# Patient Record
Sex: FEMALE | Race: WHITE | NOT HISPANIC OR LATINO | Employment: UNEMPLOYED | URBAN - METROPOLITAN AREA
[De-identification: names, ages, dates, MRNs, and addresses within clinical notes are randomized per-mention and may not be internally consistent; named-entity substitution may affect disease eponyms.]

---

## 2018-11-19 ENCOUNTER — HOSPITAL ENCOUNTER (EMERGENCY)
Facility: HOSPITAL | Age: 33
Discharge: HOME | End: 2018-11-19
Attending: EMERGENCY MEDICINE | Admitting: EMERGENCY MEDICINE

## 2018-11-19 VITALS
WEIGHT: 165 LBS | OXYGEN SATURATION: 97 % | TEMPERATURE: 99 F | SYSTOLIC BLOOD PRESSURE: 137 MMHG | DIASTOLIC BLOOD PRESSURE: 84 MMHG | BODY MASS INDEX: 32.39 KG/M2 | HEIGHT: 60 IN | RESPIRATION RATE: 18 BRPM | HEART RATE: 94 BPM

## 2018-11-19 DIAGNOSIS — L02.519 ABSCESS OF INDEX FINGER: Primary | ICD-10-CM

## 2018-11-19 PROCEDURE — 99283 EMERGENCY DEPT VISIT LOW MDM: CPT

## 2018-11-19 RX ORDER — IBUPROFEN 400 MG/1
800 TABLET ORAL ONCE
Status: COMPLETED | OUTPATIENT
Start: 2018-11-19 | End: 2018-11-19

## 2018-11-19 RX ORDER — ACETAMINOPHEN AND CODEINE PHOSPHATE 300; 30 MG/1; MG/1
1-2 TABLET ORAL EVERY 6 HOURS PRN
Qty: 15 TABLET | Refills: 0 | Status: SHIPPED | OUTPATIENT
Start: 2018-11-19 | End: 2018-11-29

## 2018-11-19 RX ORDER — CIPROFLOXACIN 500 MG/1
500 TABLET, FILM COATED ORAL ONCE
Status: COMPLETED | OUTPATIENT
Start: 2018-11-19 | End: 2018-11-19

## 2018-11-19 RX ORDER — CIPROFLOXACIN 500 MG/1
500 TABLET, FILM COATED ORAL 2 TIMES DAILY
Qty: 20 TABLET | Refills: 0 | Status: SHIPPED | OUTPATIENT
Start: 2018-11-19 | End: 2018-11-24

## 2018-11-19 RX ADMIN — CIPROFLOXACIN HYDROCHLORIDE 500 MG: 500 TABLET, FILM COATED ORAL at 20:12

## 2018-11-19 RX ADMIN — IBUPROFEN 800 MG: 400 TABLET ORAL at 20:12

## 2018-11-20 NOTE — ED PROVIDER NOTES
History  Chief Complaint   Patient presents with    Finger Pain     29YEAR-OLD FEMALE WITH NO HISTORY OF DISEASE IS DOMINANT RIGHT HAND PATIENT PRESENTS NOW WITH RIGHT INDEX FINGER PAIN FOR THE PAST 3 DAYS  SHE DENIES ANY TRAUMA OR INCITING EVENT  THE INTENSITY OF THE PAIN HAS ESCALATED TODAY DESPITE TAKING TYLENOL AND MOTRIN  SHE REPORTS NO FEVER OR CHILLS  NO CHANGE IN BOWEL OR BLADDER HABITS  None       Past Medical History:   Diagnosis Date    Asthma        Past Surgical History:   Procedure Laterality Date     SECTION         History reviewed  No pertinent family history  I have reviewed and agree with the history as documented  Social History   Substance Use Topics    Smoking status: Never Smoker    Smokeless tobacco: Never Used    Alcohol use No        Review of Systems   Constitutional: Negative for chills and fever  HENT: Negative for ear pain, rhinorrhea and sore throat  Eyes: Negative for pain, redness and visual disturbance  Respiratory: Negative for cough and shortness of breath  Cardiovascular: Negative for chest pain and leg swelling  Gastrointestinal: Negative for abdominal pain, diarrhea, nausea and vomiting  Genitourinary: Negative for dysuria, flank pain, frequency and urgency  Musculoskeletal: Negative for back pain, myalgias and neck pain  Skin: Negative for rash  Neurological: Negative for dizziness, weakness, light-headedness and headaches  Hematological: Negative  Psychiatric/Behavioral: Negative for agitation, confusion and suicidal ideas  The patient is not nervous/anxious  All other systems reviewed and are negative  Physical Exam  Physical Exam   Constitutional: She is oriented to person, place, and time  She appears well-developed and well-nourished  HENT:   Nose: Nose normal    Mouth/Throat: Oropharynx is clear and moist  No oropharyngeal exudate  Eyes: Pupils are equal, round, and reactive to light   Conjunctivae and EOM are normal  No scleral icterus  Neck: Normal range of motion  Neck supple  No JVD present  No tracheal deviation present  Cardiovascular: Normal rate, regular rhythm and normal heart sounds  No murmur heard  Pulmonary/Chest: Effort normal and breath sounds normal  No respiratory distress  She has no wheezes  She has no rales  Abdominal: Soft  Bowel sounds are normal  There is no tenderness  There is no guarding  Musculoskeletal: Normal range of motion  She exhibits edema and tenderness  CC SKIN EXAM FOR FURTHER DETAILS  Neurological: She is alert and oriented to person, place, and time  No cranial nerve deficit or sensory deficit  She exhibits normal muscle tone  5/5 motor, nl sens   Skin: Skin is warm and dry  THE RIGHT DISTAL INDEX FINGER THE ULNAR ASPECT THERE BEARS A PEARLY CAPPED EDEMATOUS FLUCTUANT LESION WITH MINIMAL SURROUNDING ERYTHEMA  THERE IS NO ENCROACHMENT ON THE NAIL  THERE IS NO DISCHARGE FROM THE LESION  THERE IS ASSOCIATED TENDERNESS WITH PALPATION OF THE LESION  Psychiatric: She has a normal mood and affect  Her behavior is normal    Nursing note and vitals reviewed        Vital Signs  ED Triage Vitals [11/19/18 1935]   Temperature Pulse Respirations Blood Pressure SpO2   99 °F (37 2 °C) 94 18 137/84 97 %      Temp src Heart Rate Source Patient Position - Orthostatic VS BP Location FiO2 (%)   -- -- -- -- --      Pain Score       8           Vitals:    11/19/18 1935   BP: 137/84   Pulse: 94       Visual Acuity      ED Medications  Medications   ciprofloxacin (CIPRO) tablet 500 mg (500 mg Oral Given 11/19/18 2012)   ibuprofen (MOTRIN) tablet 800 mg (800 mg Oral Given 11/19/18 2012)       Diagnostic Studies  Results Reviewed     None                 No orders to display              Procedures  Procedures       Phone Contacts  ED Phone Contact    ED Course                               MDM  Number of Diagnoses or Management Options  Abscess of index finger:   Diagnosis management comments: PHYSICAL EXAM FINDINGS ARE CONSISTENT WITH AN ABSCESS OF THE FINGER  THE PATIENT HAS BEEN OFFERED THE OPTION OF INCISION AND DRAINAGE VERSUS IS WARM SOAKS ANTIBIOTICS AND PAIN MEDICATIONS  PATIENT HAS ELECTED CONSERVATIVE APPROACH OF ANTIBIOTICS PAIN MEDICATIONS AND WARM SOAKS WITH A CAVITY ON THE PATIENT FOLLOW UP WITHIN THE NEXT 2 DAYS AND CONSIDER INCISION AND DRAINAGE IF THERE IS NO IMPROVEMENT  CritCare Time    Disposition  Final diagnoses:   Abscess of index finger     Time reflects when diagnosis was documented in both MDM as applicable and the Disposition within this note     Time User Action Codes Description Comment    11/19/2018  8:08 PM Catarina Eugene Add [L02 [id number]] Abscess of index finger       ED Disposition     ED Disposition Condition Comment    Discharge  Leon Rodriguez discharge to home/self care  Condition at discharge: Stable        Follow-up Information     Follow up With Specialties Details Why Contact Info    RECHECK IN ED IN 2 DAYS              Discharge Medication List as of 11/19/2018  8:10 PM      START taking these medications    Details   acetaminophen-codeine (TYLENOL #3) 300-30 mg per tablet Take 1-2 tablets by mouth every 6 (six) hours as needed for moderate pain for up to 10 days, Starting Mon 11/19/2018, Until Thu 11/29/2018, Print      ciprofloxacin (CIPRO) 500 mg tablet Take 1 tablet (500 mg total) by mouth 2 (two) times a day for 10 days, Starting Mon 11/19/2018, Until Thu 11/29/2018, Print           No discharge procedures on file      ED Provider  Electronically Signed by           Yandel Mosqueda MD  11/23/18 5631

## 2018-11-20 NOTE — DISCHARGE INSTRUCTIONS
Abscess   WHAT YOU NEED TO KNOW:   A warm compress may help your abscess drain  Your healthcare provider may make a cut in the abscess so it can drain  You may need surgery to remove an abscess that is on your hands or buttocks  DISCHARGE INSTRUCTIONS:   Return to the emergency department if:   · The area around your abscess becomes very painful, warm, or has red streaks  · You have a fever and chills  · Your heart is beating faster than usual      · You feel faint or confused  Contact your healthcare provider if:   · Your abscess gets bigger or does not get better  · Your abscess returns  · You have questions or concerns about your condition or care  Medicines: You may  need any of the following:  · Antibiotics  help treat a bacterial infection  · Acetaminophen  decreases pain and fever  It is available without a doctor's order  Ask how much to take and how often to take it  Follow directions  Acetaminophen can cause liver damage if not taken correctly  · NSAIDs , such as ibuprofen, help decrease swelling, pain, and fever  This medicine is available with or without a doctor's order  NSAIDs can cause stomach bleeding or kidney problems in certain people  If you take blood thinner medicine, always ask your healthcare provider if NSAIDs are safe for you  Always read the medicine label and follow directions  · Take your medicine as directed  Contact your healthcare provider if you think your medicine is not helping or if you have side effects  Tell him or her if you are allergic to any medicine  Keep a list of the medicines, vitamins, and herbs you take  Include the amounts, and when and why you take them  Bring the list or the pill bottles to follow-up visits  Carry your medicine list with you in case of an emergency  Self-care:   · Apply a warm compress to your abscess  This will help it open and drain  Wet a washcloth in warm, but not hot, water  Apply the compress for 10 minutes  Repeat this 4 times each day  Do not  press on an abscess or try to open it with a needle  You may push the bacteria deeper or into your blood  · Do not share your clothes, towels, or sheets with anyone  This can spread the infection to others  · Wash your hands often  This can help prevent the spread of germs  Use soap and water or an alcohol-based hand rub  Care for your wound after it is drained:   · Care for your wound as directed  If your healthcare provider says it is okay, carefully remove the bandage and gauze packing  You may need to soak the gauze to get it out of your wound  Clean your wound and the area around it as directed  Dry the area and put on new, clean bandages  Change your bandages when they get wet or dirty  · Ask your healthcare provider how to change the gauze in your wound  Keep track of how many pieces of gauze are placed inside the wound  Do not put too much packing in the wound  Do not pack the gauze too tightly in your wound  Follow up with your healthcare provider in 1 to 3 days: You may need to have your packing removed or your bandage changed  Write down your questions so you remember to ask them during your visits  © 2017 [facility] Information is for End User's use only and may not be sold, redistributed or otherwise used for commercial purposes  All illustrations and images included in CareNotes® are the copyrighted property of [employer]  or [employer]  The above information is an  only  It is not intended as medical advice for individual conditions or treatments  Talk to your doctor, nurse or pharmacist before following any medical regimen to see if it is safe and effective for you  Abscess   WHAT YOU NEED TO KNOW:   A warm compress may help your abscess drain  Your healthcare provider may make a cut in the abscess so it can drain   You may need surgery to remove an abscess that is on your hands or buttocks  DISCHARGE INSTRUCTIONS:   Return to the emergency department if:   · The area around your abscess becomes very painful, warm, or has red streaks  · You have a fever and chills  · Your heart is beating faster than usual      · You feel faint or confused  Contact your healthcare provider if:   · Your abscess gets bigger or does not get better  · Your abscess returns  · You have questions or concerns about your condition or care  Medicines: You may  need any of the following:  · Antibiotics  help treat a bacterial infection  · Acetaminophen  decreases pain and fever  It is available without a doctor's order  Ask how much to take and how often to take it  Follow directions  Acetaminophen can cause liver damage if not taken correctly  · NSAIDs , such as ibuprofen, help decrease swelling, pain, and fever  This medicine is available with or without a doctor's order  NSAIDs can cause stomach bleeding or kidney problems in certain people  If you take blood thinner medicine, always ask your healthcare provider if NSAIDs are safe for you  Always read the medicine label and follow directions  · Take your medicine as directed  Contact your healthcare provider if you think your medicine is not helping or if you have side effects  Tell him or her if you are allergic to any medicine  Keep a list of the medicines, vitamins, and herbs you take  Include the amounts, and when and why you take them  Bring the list or the pill bottles to follow-up visits  Carry your medicine list with you in case of an emergency  Self-care:   · Apply a warm compress to your abscess  This will help it open and drain  Wet a washcloth in warm, but not hot, water  Apply the compress for 10 minutes  Repeat this 4 times each day  Do not  press on an abscess or try to open it with a needle  You may push the bacteria deeper or into your blood  · Do not share your clothes, towels, or sheets with anyone    This can spread the infection to others  · Wash your hands often  This can help prevent the spread of germs  Use soap and water or an alcohol-based hand rub  Care for your wound after it is drained:   · Care for your wound as directed  If your healthcare provider says it is okay, carefully remove the bandage and gauze packing  You may need to soak the gauze to get it out of your wound  Clean your wound and the area around it as directed  Dry the area and put on new, clean bandages  Change your bandages when they get wet or dirty  · Ask your healthcare provider how to change the gauze in your wound  Keep track of how many pieces of gauze are placed inside the wound  Do not put too much packing in the wound  Do not pack the gauze too tightly in your wound  Follow up with your healthcare provider in 1 to 3 days: You may need to have your packing removed or your bandage changed  Write down your questions so you remember to ask them during your visits  © 2017 [facility] Information is for End User's use only and may not be sold, redistributed or otherwise used for commercial purposes  All illustrations and images included in CareNotes® are the copyrighted property of [employer]  or [employer]  The above information is an  only  It is not intended as medical advice for individual conditions or treatments  Talk to your doctor, nurse or pharmacist before following any medical regimen to see if it is safe and effective for you

## 2018-11-24 ENCOUNTER — OFFICE VISIT (OUTPATIENT)
Dept: URGENT CARE | Facility: CLINIC | Age: 33
End: 2018-11-24

## 2018-11-24 ENCOUNTER — HOSPITAL ENCOUNTER (EMERGENCY)
Facility: HOSPITAL | Age: 33
Discharge: HOME | End: 2018-11-24
Attending: EMERGENCY MEDICINE | Admitting: EMERGENCY MEDICINE

## 2018-11-24 ENCOUNTER — APPOINTMENT (EMERGENCY)
Dept: RADIOLOGY | Facility: HOSPITAL | Age: 33
End: 2018-11-24

## 2018-11-24 VITALS
HEART RATE: 78 BPM | OXYGEN SATURATION: 98 % | SYSTOLIC BLOOD PRESSURE: 136 MMHG | BODY MASS INDEX: 32.39 KG/M2 | RESPIRATION RATE: 16 BRPM | DIASTOLIC BLOOD PRESSURE: 70 MMHG | HEIGHT: 60 IN | TEMPERATURE: 98.2 F | WEIGHT: 165 LBS

## 2018-11-24 VITALS
HEIGHT: 60 IN | WEIGHT: 165 LBS | TEMPERATURE: 98.2 F | RESPIRATION RATE: 16 BRPM | OXYGEN SATURATION: 100 % | DIASTOLIC BLOOD PRESSURE: 86 MMHG | SYSTOLIC BLOOD PRESSURE: 160 MMHG | BODY MASS INDEX: 32.39 KG/M2 | HEART RATE: 92 BPM

## 2018-11-24 DIAGNOSIS — L02.519 ABSCESS OF INDEX FINGER: ICD-10-CM

## 2018-11-24 DIAGNOSIS — L03.011 PARONYCHIA OF FINGER OF RIGHT HAND: Primary | ICD-10-CM

## 2018-11-24 DIAGNOSIS — L02.511 ABSCESS OF FINGER OF RIGHT HAND: ICD-10-CM

## 2018-11-24 DIAGNOSIS — L02.511 ABSCESS OF RIGHT INDEX FINGER: Primary | ICD-10-CM

## 2018-11-24 LAB
ALBUMIN SERPL BCP-MCNC: 4.4 G/DL (ref 3.5–5.7)
ALP SERPL-CCNC: 92 U/L (ref 40–150)
ALT SERPL W P-5'-P-CCNC: 8 U/L (ref 7–52)
ANION GAP SERPL CALCULATED.3IONS-SCNC: 8 MMOL/L (ref 4–13)
APTT PPP: 27 SECONDS (ref 26–38)
AST SERPL W P-5'-P-CCNC: 14 U/L (ref 13–39)
BASOPHILS # BLD AUTO: 0.1 THOUSANDS/ΜL (ref 0–0.1)
BASOPHILS NFR BLD AUTO: 1 % (ref 0–1)
BILIRUB SERPL-MCNC: 0.3 MG/DL (ref 0.2–1)
BUN SERPL-MCNC: 9 MG/DL (ref 7–25)
CALCIUM SERPL-MCNC: 9.6 MG/DL (ref 8.6–10.5)
CHLORIDE SERPL-SCNC: 102 MMOL/L (ref 98–107)
CO2 SERPL-SCNC: 25 MMOL/L (ref 21–31)
CREAT SERPL-MCNC: 0.77 MG/DL (ref 0.6–1.2)
EOSINOPHIL # BLD AUTO: 0.1 THOUSAND/ΜL (ref 0–0.61)
EOSINOPHIL NFR BLD AUTO: 1 % (ref 0–6)
ERYTHROCYTE [DISTWIDTH] IN BLOOD BY AUTOMATED COUNT: 12.8 % (ref 11.6–15.1)
EXT PREG TEST URINE: NORMAL
GFR SERPL CREATININE-BSD FRML MDRD: 102 ML/MIN/1.73SQ M
GLUCOSE SERPL-MCNC: 88 MG/DL (ref 65–140)
HCT VFR BLD AUTO: 42.3 % (ref 37–47)
HGB BLD-MCNC: 14.2 G/DL (ref 11.5–15.4)
INR PPP: 0.93 (ref 0.9–1.5)
LACTATE SERPL-SCNC: 1.5 MMOL/L (ref 0.5–2)
LYMPHOCYTES # BLD AUTO: 1.9 THOUSANDS/ΜL (ref 0.6–4.47)
LYMPHOCYTES NFR BLD AUTO: 18 % (ref 14–44)
MCH RBC QN AUTO: 29.3 PG (ref 26.8–34.3)
MCHC RBC AUTO-ENTMCNC: 33.6 G/DL (ref 31.4–37.4)
MCV RBC AUTO: 87 FL (ref 82–98)
MONOCYTES # BLD AUTO: 0.8 THOUSAND/ΜL (ref 0.17–1.22)
MONOCYTES NFR BLD AUTO: 8 % (ref 4–12)
NEUTROPHILS # BLD AUTO: 7.4 THOUSANDS/ΜL (ref 1.85–7.62)
NEUTS SEG NFR BLD AUTO: 73 % (ref 43–75)
NRBC BLD AUTO-RTO: 0 /100 WBCS
PLATELET # BLD AUTO: 327 THOUSANDS/UL (ref 149–390)
PMV BLD AUTO: 7.9 FL (ref 8.9–12.7)
POTASSIUM SERPL-SCNC: 4.2 MMOL/L (ref 3.5–5.5)
PROT SERPL-MCNC: 7.6 G/DL (ref 6.4–8.9)
PROTHROMBIN TIME: 10.7 SECONDS (ref 10.2–13)
RBC # BLD AUTO: 4.84 MILLION/UL (ref 3.81–5.12)
SODIUM SERPL-SCNC: 135 MMOL/L (ref 134–143)
WBC # BLD AUTO: 10.2 THOUSAND/UL (ref 4.31–10.16)

## 2018-11-24 PROCEDURE — 83605 ASSAY OF LACTIC ACID: CPT | Performed by: EMERGENCY MEDICINE

## 2018-11-24 PROCEDURE — 73140 X-RAY EXAM OF FINGER(S): CPT

## 2018-11-24 PROCEDURE — 87040 BLOOD CULTURE FOR BACTERIA: CPT | Performed by: EMERGENCY MEDICINE

## 2018-11-24 PROCEDURE — 81025 URINE PREGNANCY TEST: CPT | Performed by: EMERGENCY MEDICINE

## 2018-11-24 PROCEDURE — 85025 COMPLETE CBC W/AUTO DIFF WBC: CPT | Performed by: EMERGENCY MEDICINE

## 2018-11-24 PROCEDURE — 85610 PROTHROMBIN TIME: CPT | Performed by: EMERGENCY MEDICINE

## 2018-11-24 PROCEDURE — 99211 OFF/OP EST MAY X REQ PHY/QHP: CPT | Performed by: PHYSICIAN ASSISTANT

## 2018-11-24 PROCEDURE — 85730 THROMBOPLASTIN TIME PARTIAL: CPT | Performed by: EMERGENCY MEDICINE

## 2018-11-24 PROCEDURE — 96365 THER/PROPH/DIAG IV INF INIT: CPT

## 2018-11-24 PROCEDURE — 96375 TX/PRO/DX INJ NEW DRUG ADDON: CPT

## 2018-11-24 PROCEDURE — 99283 EMERGENCY DEPT VISIT LOW MDM: CPT

## 2018-11-24 PROCEDURE — 36415 COLL VENOUS BLD VENIPUNCTURE: CPT | Performed by: EMERGENCY MEDICINE

## 2018-11-24 PROCEDURE — 80053 COMPREHEN METABOLIC PANEL: CPT | Performed by: EMERGENCY MEDICINE

## 2018-11-24 RX ORDER — OXYCODONE HYDROCHLORIDE AND ACETAMINOPHEN 5; 325 MG/1; MG/1
1 TABLET ORAL ONCE
Status: COMPLETED | OUTPATIENT
Start: 2018-11-24 | End: 2018-11-24

## 2018-11-24 RX ORDER — CLINDAMYCIN HYDROCHLORIDE 150 MG/1
150 CAPSULE ORAL EVERY 6 HOURS SCHEDULED
Qty: 28 CAPSULE | Refills: 0 | Status: SHIPPED | OUTPATIENT
Start: 2018-11-24 | End: 2018-12-01

## 2018-11-24 RX ORDER — CLINDAMYCIN PHOSPHATE 600 MG/50ML
600 INJECTION INTRAVENOUS ONCE
Status: COMPLETED | OUTPATIENT
Start: 2018-11-24 | End: 2018-11-24

## 2018-11-24 RX ORDER — MORPHINE SULFATE 4 MG/ML
4 INJECTION, SOLUTION INTRAMUSCULAR; INTRAVENOUS ONCE
Status: COMPLETED | OUTPATIENT
Start: 2018-11-24 | End: 2018-11-24

## 2018-11-24 RX ORDER — LIDOCAINE HYDROCHLORIDE 10 MG/ML
10 INJECTION, SOLUTION EPIDURAL; INFILTRATION; INTRACAUDAL; PERINEURAL ONCE
Status: COMPLETED | OUTPATIENT
Start: 2018-11-24 | End: 2018-11-24

## 2018-11-24 RX ADMIN — MORPHINE SULFATE 4 MG: 4 INJECTION INTRAVENOUS at 12:18

## 2018-11-24 RX ADMIN — LIDOCAINE HYDROCHLORIDE 10 ML: 10 INJECTION, SOLUTION EPIDURAL; INFILTRATION; INTRACAUDAL; PERINEURAL at 16:05

## 2018-11-24 RX ADMIN — CLINDAMYCIN IN 5 PERCENT DEXTROSE 600 MG: 12 INJECTION, SOLUTION INTRAVENOUS at 12:18

## 2018-11-24 RX ADMIN — SODIUM CHLORIDE 1000 ML: 0.9 INJECTION, SOLUTION INTRAVENOUS at 12:12

## 2018-11-24 RX ADMIN — OXYCODONE HYDROCHLORIDE AND ACETAMINOPHEN 1 TABLET: 5; 325 TABLET ORAL at 16:16

## 2018-11-24 NOTE — DISCHARGE INSTRUCTIONS
Abscess   WHAT YOU NEED TO KNOW:   A warm compress may help your abscess drain  Your healthcare provider may make a cut in the abscess so it can drain  You may need surgery to remove an abscess that is on your hands or buttocks  DISCHARGE INSTRUCTIONS:   Return to the emergency department if:   · The area around your abscess becomes very painful, warm, or has red streaks  · You have a fever and chills  · Your heart is beating faster than usual      · You feel faint or confused  Contact your healthcare provider if:   · Your abscess gets bigger or does not get better  · Your abscess returns  · You have questions or concerns about your condition or care  Medicines: You may  need any of the following:  · Antibiotics  help treat a bacterial infection  · Acetaminophen  decreases pain and fever  It is available without a doctor's order  Ask how much to take and how often to take it  Follow directions  Acetaminophen can cause liver damage if not taken correctly  · NSAIDs , such as ibuprofen, help decrease swelling, pain, and fever  This medicine is available with or without a doctor's order  NSAIDs can cause stomach bleeding or kidney problems in certain people  If you take blood thinner medicine, always ask your healthcare provider if NSAIDs are safe for you  Always read the medicine label and follow directions  · Take your medicine as directed  Contact your healthcare provider if you think your medicine is not helping or if you have side effects  Tell him or her if you are allergic to any medicine  Keep a list of the medicines, vitamins, and herbs you take  Include the amounts, and when and why you take them  Bring the list or the pill bottles to follow-up visits  Carry your medicine list with you in case of an emergency  Self-care:   · Apply a warm compress to your abscess  This will help it open and drain  Wet a washcloth in warm, but not hot, water  Apply the compress for 10 minutes  Repeat this 4 times each day  Do not  press on an abscess or try to open it with a needle  You may push the bacteria deeper or into your blood  · Do not share your clothes, towels, or sheets with anyone  This can spread the infection to others  · Wash your hands often  This can help prevent the spread of germs  Use soap and water or an alcohol-based hand rub  Care for your wound after it is drained:   · Care for your wound as directed  If your healthcare provider says it is okay, carefully remove the bandage and gauze packing  You may need to soak the gauze to get it out of your wound  Clean your wound and the area around it as directed  Dry the area and put on new, clean bandages  Change your bandages when they get wet or dirty  · Ask your healthcare provider how to change the gauze in your wound  Keep track of how many pieces of gauze are placed inside the wound  Do not put too much packing in the wound  Do not pack the gauze too tightly in your wound  Follow up with your healthcare provider in 1 to 3 days: You may need to have your packing removed or your bandage changed  Write down your questions so you remember to ask them during your visits  © 2017 [employer] [employer]  Information is for End User's use only and may not be sold, redistributed or otherwise used for commercial purposes  All illustrations and images included in CareNotes® are the copyrighted property of [email or web address]  or [employer]  The above information is an  only  It is not intended as medical advice for individual conditions or treatments  Talk to your doctor, nurse or pharmacist before following any medical regimen to see if it is safe and effective for you

## 2018-11-24 NOTE — PROGRESS NOTES
[facility]        NAME: Sarah Odell is a 29 y o  female  : 1985    MRN: [id number]  DATE: 2018  TIME: 11:03 AM    Assessment and Plan   Abscess of right index finger [L02 511]  1  Abscess of right index finger  Transfer to other facility         Patient Instructions       Go to the emergency room for further evaluation and treatment  Chief Complaint     Chief Complaint   Patient presents with    Finger Pain         History of Present Illness       Patient was seen in the emergency room 5 days ago where she was diagnosed with the abscess of the right index finger  Patient was prescribed Cipro and Tylenol 3 for pain  Patient did not  either prescription now presents with increased pain and swelling of the finger  She denies any fever chills  Patient states that she did not have the money to a  the prescribed medication  Patient's boyfriend applied black drawing salve to the finger hoping to drain the area  This was ineffective  Review of Systems   Review of Systems   Constitutional: Negative for fever  Neurological: Negative for weakness and numbness  Current Medications       Current Outpatient Prescriptions:     acetaminophen-codeine (TYLENOL #3) 300-30 mg per tablet, Take 1-2 tablets by mouth every 6 (six) hours as needed for moderate pain for up to 10 days, Disp: 15 tablet, Rfl: 0    ciprofloxacin (CIPRO) 500 mg tablet, Take 1 tablet (500 mg total) by mouth 2 (two) times a day for 10 days, Disp: 20 tablet, Rfl: 0  No current facility-administered medications for this visit       Current Allergies     Allergies as of 2018 - Reviewed 2018   Allergen Reaction Noted    Amoxicillin  2016    Penicillins  2016            The following portions of the patient's history were reviewed and updated as appropriate: allergies, current medications, past family history, past medical history, past social history, past surgical history and problem list      Past Medical History:   Diagnosis Date    Asthma        Past Surgical History:   Procedure Laterality Date     SECTION         History reviewed  No pertinent family history  Medications have been verified  Objective   /86   Pulse 92   Temp 98 2 °F (36 8 °C)   Resp 16   Ht 5' (1 524 m)   Wt 74 8 kg (165 lb)   SpO2 100%   BMI 32 22 kg/m²        Physical Exam     Physical Exam   Constitutional: She appears well-developed and well-nourished  Musculoskeletal:   Swelling of the right index finger with what appears to be an abscess of the distal aspect  Mild warmth with exquisite tenderness to palpation  Psychiatric: She has a normal mood and affect  Her behavior is normal  Judgment and thought content normal    Nursing note and vitals reviewed  Patient was referred to the emergency room for I and D, pain control and possible blood work and IV antibiotics of which the latter 2 are not offered at urgent care

## 2018-11-24 NOTE — ED PROVIDER NOTES
History  Chief Complaint   Patient presents with    Abscess     According to the patient,she has had an abscess on the right index finger for the past 6 days  Patient reports swelling and pain in the finger  72-year-old female with history of asthma and IV drug abuse in the past presents for evaluation of right index finger abscess  Patient with symptoms x1 week, was evaluated in [facility] ED 2018 and at that time patient refused I&D and did not fill the prescription for ciprofloxacin as an outpatient  Patient reports continued swelling and pain of the right index finger which is worsening over time  Otherwise patient no fevers, chills, additional injury or complaint  History provided by:  Patient   used: No        Prior to Admission Medications   Prescriptions Last Dose Informant Patient Reported? Taking?   acetaminophen-codeine (TYLENOL #3) 300-30 mg per tablet   No No   Sig: Take 1-2 tablets by mouth every 6 (six) hours as needed for moderate pain for up to 10 days   ciprofloxacin (CIPRO) 500 mg tablet   No No   Sig: Take 1 tablet (500 mg total) by mouth 2 (two) times a day for 10 days      Facility-Administered Medications: None       Past Medical History:   Diagnosis Date    Asthma        Past Surgical History:   Procedure Laterality Date     SECTION      WISDOM TOOTH EXTRACTION         History reviewed  No pertinent family history  I have reviewed and agree with the history as documented  Social History   Substance Use Topics    Smoking status: Never Smoker    Smokeless tobacco: Never Used    Alcohol use No        Review of Systems   Constitutional: Negative for chills and fever  HENT: Negative for rhinorrhea and sore throat  Eyes: Negative for visual disturbance  Respiratory: Negative for cough and shortness of breath  Cardiovascular: Negative for chest pain and leg swelling     Gastrointestinal: Negative for abdominal pain, diarrhea, nausea and vomiting  Genitourinary: Negative for dysuria  Musculoskeletal: Negative for back pain and myalgias  Right index finger pain and swelling with abscess   Skin: Positive for wound  Negative for rash  Neurological: Negative for dizziness and headaches  Psychiatric/Behavioral: Negative for confusion  All other systems reviewed and are negative  Physical Exam  Physical Exam   Constitutional: She is oriented to person, place, and time  She appears well-developed and well-nourished  She appears distressed  Moderate painful distress   HENT:   Nose: Nose normal    Mouth/Throat: Oropharynx is clear and moist  No oropharyngeal exudate  Eyes: Pupils are equal, round, and reactive to light  Conjunctivae and EOM are normal  No scleral icterus  Neck: Normal range of motion  Neck supple  No JVD present  No tracheal deviation present  Cardiovascular: Normal rate, regular rhythm and normal heart sounds  No murmur heard  Pulmonary/Chest: Effort normal and breath sounds normal  No respiratory distress  She has no wheezes  She has no rales  Abdominal: Soft  Bowel sounds are normal  There is no tenderness  There is no guarding  Musculoskeletal: She exhibits edema and tenderness  Swelling and obvious abscess to the distal aspect of the right index finger with swelling of the entire finger and painful extension and flexion   Neurological: She is alert and oriented to person, place, and time  No cranial nerve deficit or sensory deficit  She exhibits normal muscle tone  5/5 motor, nl sens   Skin: Skin is warm and dry  There is erythema  Psychiatric: She has a normal mood and affect  Her behavior is normal    Nursing note and vitals reviewed        Vital Signs  ED Triage Vitals [11/24/18 1138]   Temperature Pulse Respirations Blood Pressure SpO2   98 7 °F (37 1 °C) 77 16 152/93 96 %      Temp Source Heart Rate Source Patient Position - Orthostatic VS BP Location FiO2 (%)   Tympanic Monitor Lying Left arm --      Pain Score       Worst Possible Pain           Vitals:    11/24/18 1138 11/24/18 1426 11/24/18 1618   BP: 152/93 140/72 136/70   Pulse: 77 78 78   Patient Position - Orthostatic VS: Lying Lying Sitting       Visual Acuity      ED Medications  Medications   sodium chloride 0 9 % bolus 1,000 mL (0 mL Intravenous Stopped 11/24/18 1312)   morphine (PF) 4 mg/mL injection 4 mg (4 mg Intravenous Given 11/24/18 1218)   clindamycin (CLEOCIN) IVPB (premix) 600 mg (0 mg Intravenous Stopped 11/24/18 1248)   lidocaine (PF) (XYLOCAINE-MPF) 1 % injection 10 mL (10 mL Infiltration Given 11/24/18 1605)   oxyCODONE-acetaminophen (PERCOCET) 5-325 mg per tablet 1 tablet (1 tablet Oral Given 11/24/18 1616)       Diagnostic Studies  Results Reviewed     Procedure Component Value Units Date/Time    POCT pregnancy, urine [[id number]]  (Normal) Resulted:  11/24/18 1238    Lab Status:  Final result Updated:  11/24/18 1239     EXT PREG TEST UR (Ref: Negative) Negative ED doctor aware and RN    Lactic acid, plasma [[id number]]  (Normal) Collected:  11/24/18 1209    Lab Status:  Final result Specimen:  Blood from Arm, Left Updated:  11/24/18 1239     LACTIC ACID 1 5 mmol/L     Narrative:         Result may be elevated if tourniquet was used during collection      Comprehensive metabolic panel [[id number]] Collected:  11/24/18 1209    Lab Status:  Final result Specimen:  Blood from Arm, Left Updated:  11/24/18 1239     Sodium 135 mmol/L      Potassium 4 2 mmol/L      Chloride 102 mmol/L      CO2 25 mmol/L      ANION GAP 8 mmol/L      BUN 9 mg/dL      Creatinine 0 77 mg/dL      Glucose 88 mg/dL      Calcium 9 6 mg/dL      AST 14 U/L      ALT 8 U/L      Alkaline Phosphatase 92 U/L      Total Protein 7 6 g/dL      Albumin 4 4 g/dL      Total Bilirubin 0 30 mg/dL      eGFR 102 ml/min/1 73sq m     Narrative:         National Kidney Disease Education Program recommendations are as follows:  GFR calculation is accurate only with a steady state creatinine  Chronic Kidney disease less than 60 ml/min/1 73 sq  meters  Kidney failure less than 15 ml/min/1 73 sq  meters  Protime-INR [[id number]]  (Normal) Collected:  11/24/18 1209    Lab Status:  Final result Specimen:  Blood from Arm, Left Updated:  11/24/18 1234     Protime 10 7 seconds      INR 0 93    APTT [[id number]]  (Normal) Collected:  11/24/18 1209    Lab Status:  Final result Specimen:  Blood from Arm, Left Updated:  11/24/18 1234     PTT 27 seconds     CBC and differential [[id number]]  (Abnormal) Collected:  11/24/18 1209    Lab Status:  Final result Specimen:  Blood from Arm, Left Updated:  11/24/18 1220     WBC 10 20 (H) Thousand/uL      RBC 4 84 Million/uL      Hemoglobin 14 2 g/dL      Hematocrit 42 3 %      MCV 87 fL      MCH 29 3 pg      MCHC 33 6 g/dL      RDW 12 8 %      MPV 7 9 (L) fL      Platelets 128 Thousands/uL      nRBC 0 /100 WBCs      Neutrophils Relative 73 %      Lymphocytes Relative 18 %      Monocytes Relative 8 %      Eosinophils Relative 1 %      Basophils Relative 1 %      Neutrophils Absolute 7 40 Thousands/µL      Lymphocytes Absolute 1 90 Thousands/µL      Monocytes Absolute 0 80 Thousand/µL      Eosinophils Absolute 0 10 Thousand/µL      Basophils Absolute 0 10 Thousands/µL     Blood culture #1 [[id number]] Collected:  11/24/18 1209    Lab Status: In process Specimen:  Blood from Arm, Left Updated:  11/24/18 1213    Blood culture #2 [[id number]] Collected:  11/24/18 1209    Lab Status: In process Specimen:  Blood from Arm, Left Updated:  11/24/18 1213                 XR finger second digit-index RIGHT   Final Result by Gregg Altman (11/24 5636)   No osseous abnormality              Signed by Gregg Altman MD                 Procedures  Incision/Drainage  Date/Time: 11/24/2018 4:00 PM  Performed by: Raymund Boxer by: Fabian Alonso     Patient location:  ED  Other Assisting Provider: No    Consent:     Consent obtained:  Verbal    Consent given by:  Patient    Risks discussed:  Bleeding, incomplete drainage, pain and infection    Alternatives discussed:  No treatment, delayed treatment, alternative treatment and referral  Universal protocol:     Procedure explained and questions answered to patient or proxy's satisfaction: yes      Relevant documents present and verified: yes      Test results available and properly labeled: yes      Radiology Images displayed and confirmed  If images not available, report reviewed: yes      Site/side marked: yes      Immediately prior to procedure a time out was called: yes      Patient identity confirmed:  Verbally with patient and arm band  Location:     Type:  Abscess    Size:  Paranoia    Location:  Upper extremity    Upper extremity location:  R index finger  Pre-procedure details:     Skin preparation:  Chloraprep  Sedation:     Sedation type: Anxiolysis (Morphine)  Anesthesia (see MAR for exact dosages): Anesthesia method:  Nerve block    Block location:  Right index finger    Block needle gauge:  25 G    Block anesthetic:  Lidocaine 1% w/o epi    Block injection procedure:  Anatomic landmarks identified, introduced needle, incremental injection, anatomic landmarks palpated and negative aspiration for blood    Block outcome:  Anesthesia achieved  Procedure details:     Complexity:  Simple    Needle aspiration: no      Incision types:  Elliptical    Scalpel blade:  11    Approach:  Open    Incision depth:  Skin    Wound management:  Probed and deloculated, irrigated with saline and extensive cleaning    Drainage:  Purulent    Drainage amount: Moderate    Wound treatment:  Wound left open    Packing materials:  None  Post-procedure details:     Patient tolerance of procedure: Tolerated well, no immediate complications           Phone Contacts  ED Phone Contact    ED Course  ED Course as of Nov 24 1722   Sat Nov 24, 2018   1136 Patient seen examined at bedside  Labs and imaging ordered      [facility] Dr Justine Colbert, on call for Orthopedic surgery  Recommends x-ray rule out osteomyelitis  IV antibiotics to be ordered  Clindamycin 600 mg IV ordered as patient is penicillin allergic  1315 Imaging and labs review  Imaging with no evidence of osteomyelitis  Labs and mild leukocytosis, WBC 10 2     1550 Paronychia drained at bedside see separate procedure note  Patient tolerated well  Percocet 1 tab given postprocedure    1610 Discussed with patient discharge plan and instructions  Discussed with patient importance of compliance with antibiotics as an outpatient  Prescription for clindamycin sent to patient's pharmacy  Discussed risk of further infection and abscess of the finger which could lead to osteomyelitis, sepsis and need for amputation of the finger or hand which may cause permanent disability or death  Patient does not have PMD at this time  Discussed with patient follow up in this ED or at [facility] for re-evaluation of abscess  Patient to return to ED sooner if any change or worsening condition, increase in size of abscess, new onset fevers or increasing pain without fail  MDM  CritCare Time    Disposition  Final diagnoses:   Paronychia of finger of right hand   Abscess of finger of right hand     Time reflects when diagnosis was documented in both MDM as applicable and the Disposition within this note     Time User Action Codes Description Comment    11/24/2018  4:07 PM Krishna Cardona Add [L02 519] Abscess of index finger     11/24/2018  4:08 PM Krishna Cardona Add [D57 [id number]] Paronychia of finger of right hand     11/24/2018  4:08 PM Emilia Feliciano Add [L02 [id number]] Abscess of finger of right hand       ED Disposition     ED Disposition Condition Comment    Discharge  Blanca Matawan discharge to home/self care      Condition at discharge: Stable        Follow-up Information     Follow up With Specialties Details Why [address] Emergency Department Emergency Medicine In 2 days For wound re-evaluation [place] [id number]  [phone]          Discharge Medication List as of 11/24/2018  4:09 PM      START taking these medications    Details   clindamycin (CLEOCIN) 150 mg capsule Take 1 capsule (150 mg total) by mouth every 6 (six) hours for 7 days, Starting Sat 11/24/2018, Until Sat 12/1/2018, Normal         CONTINUE these medications which have NOT CHANGED    Details   acetaminophen-codeine (TYLENOL #3) 300-30 mg per tablet Take 1-2 tablets by mouth every 6 (six) hours as needed for moderate pain for up to 10 days, Starting Mon 11/19/2018, Until Thu 11/29/2018, Print         STOP taking these medications       ciprofloxacin (CIPRO) 500 mg tablet Comments:   Reason for Stopping:             No discharge procedures on file      ED Provider  Electronically Signed by           Scot Brush DO  11/24/18 0744

## 2018-11-29 LAB
BACTERIA BLD CULT: NORMAL
BACTERIA BLD CULT: NORMAL

## 2020-10-21 ENCOUNTER — HOSPITAL ENCOUNTER (OUTPATIENT)
Dept: RADIOLOGY | Facility: HOSPITAL | Age: 35
Discharge: HOME | End: 2020-10-21
Payer: COMMERCIAL

## 2020-10-21 ENCOUNTER — TRANSCRIBE ORDERS (OUTPATIENT)
Dept: ADMINISTRATIVE | Facility: HOSPITAL | Age: 35
End: 2020-10-21

## 2020-10-21 DIAGNOSIS — R76.12 NONSPECIFIC REACTION TO CELL MEDIATED IMMUNITY MEASUREMENT OF GAMMA INTERFERON ANTIGEN RESPONSE WITHOUT ACTIVE TUBERCULOSIS: Primary | ICD-10-CM

## 2020-10-21 DIAGNOSIS — R76.12 NONSPECIFIC REACTION TO CELL MEDIATED IMMUNITY MEASUREMENT OF GAMMA INTERFERON ANTIGEN RESPONSE WITHOUT ACTIVE TUBERCULOSIS: ICD-10-CM

## 2020-10-21 PROCEDURE — 71046 X-RAY EXAM CHEST 2 VIEWS: CPT

## 2021-01-04 ENCOUNTER — HOSPITAL ENCOUNTER (EMERGENCY)
Facility: HOSPITAL | Age: 36
Discharge: HOME | End: 2021-01-04
Attending: FAMILY MEDICINE
Payer: COMMERCIAL

## 2021-01-04 VITALS
HEART RATE: 90 BPM | RESPIRATION RATE: 16 BRPM | DIASTOLIC BLOOD PRESSURE: 104 MMHG | SYSTOLIC BLOOD PRESSURE: 179 MMHG | TEMPERATURE: 97.9 F | HEIGHT: 60 IN | OXYGEN SATURATION: 97 % | BODY MASS INDEX: 37.3 KG/M2 | WEIGHT: 190 LBS

## 2021-01-04 DIAGNOSIS — R22.0 FACIAL SWELLING: ICD-10-CM

## 2021-01-04 DIAGNOSIS — K04.7 DENTAL ABSCESS: Primary | ICD-10-CM

## 2021-01-04 PROCEDURE — 99284 EMERGENCY DEPT VISIT MOD MDM: CPT | Performed by: FAMILY MEDICINE

## 2021-01-04 PROCEDURE — 99282 EMERGENCY DEPT VISIT SF MDM: CPT

## 2021-01-04 RX ORDER — CLINDAMYCIN HYDROCHLORIDE 300 MG/1
300 CAPSULE ORAL 4 TIMES DAILY
Qty: 28 CAPSULE | Refills: 0 | Status: SHIPPED | OUTPATIENT
Start: 2021-01-04 | End: 2021-01-11

## 2021-01-04 RX ORDER — CITALOPRAM 20 MG/1
20 TABLET ORAL DAILY
COMMUNITY

## 2021-01-04 RX ORDER — CLINDAMYCIN HYDROCHLORIDE 150 MG/1
300 CAPSULE ORAL ONCE
Status: COMPLETED | OUTPATIENT
Start: 2021-01-04 | End: 2021-01-04

## 2021-01-04 RX ORDER — IBUPROFEN 600 MG/1
600 TABLET ORAL ONCE
Status: COMPLETED | OUTPATIENT
Start: 2021-01-04 | End: 2021-01-04

## 2021-01-04 RX ORDER — QUETIAPINE FUMARATE 100 MG/1
25 TABLET, FILM COATED ORAL
COMMUNITY

## 2021-01-04 RX ADMIN — IBUPROFEN 600 MG: 600 TABLET, FILM COATED ORAL at 12:38

## 2021-01-04 RX ADMIN — CLINDAMYCIN HYDROCHLORIDE 300 MG: 150 CAPSULE ORAL at 12:38

## 2021-01-04 NOTE — ED PROVIDER NOTES
History  Chief Complaint   Patient presents with    Dental Swelling     staretd last night with pain on the left upper and swelling states thisAM, tylenol for pain did not help  does not have dentist      HPI  This is a 59-year-old female history of polysubstance abuse currently in recovery presented to ED with the complain of left facial swelling and left ankle pain  Patient states that she woke up last night with the pain on left side of her face  Patient states she is unable to see a dentist due to change in insurance  She states that she is aware that she has multiple dental caries and dental fracture any states he dentist   She is denying any fever chills nausea vomiting at this time  She states she did not take anything for pain  Patient is refusing any narcotics at this time since she is in recovery  Prior to Admission Medications   Prescriptions Last Dose Informant Patient Reported? Taking? QUEtiapine (SEROquel) 100 mg tablet   Yes Yes   Sig: Take 100 mg by mouth daily at bedtime   citalopram (CeleXA) 20 mg tablet   Yes Yes   Sig: Take 20 mg by mouth daily      Facility-Administered Medications: None       Past Medical History:   Diagnosis Date    Asthma        Past Surgical History:   Procedure Laterality Date     SECTION      TONSILLECTOMY      WISDOM TOOTH EXTRACTION         History reviewed  No pertinent family history  I have reviewed and agree with the history as documented  E-Cigarette/Vaping     E-Cigarette/Vaping Substances     Social History     Tobacco Use    Smoking status: Never Smoker    Smokeless tobacco: Never Used   Substance Use Topics    Alcohol use: No    Drug use: Yes     Types: Methamphetamines       Review of Systems   Constitutional: Negative  HENT: Positive for dental problem and facial swelling  Eyes: Negative  Respiratory: Negative  Cardiovascular: Negative  Gastrointestinal: Negative  Genitourinary: Negative      Musculoskeletal: Negative  Neurological: Negative  Psychiatric/Behavioral: Negative  Physical Exam  Physical Exam  Vitals signs and nursing note reviewed  Constitutional:       Appearance: Normal appearance  HENT:      Head: Normocephalic and atraumatic  Mouth/Throat:      Dentition: Dental caries and dental abscesses present  Pharynx: No pharyngeal swelling, oropharyngeal exudate or uvula swelling  Tonsils: No tonsillar exudate or tonsillar abscesses  Eyes:      Extraocular Movements: Extraocular movements intact  Conjunctiva/sclera: Conjunctivae normal       Pupils: Pupils are equal, round, and reactive to light  Neck:      Musculoskeletal: Normal range of motion and neck supple  Cardiovascular:      Rate and Rhythm: Normal rate and regular rhythm  Pulmonary:      Effort: Pulmonary effort is normal       Breath sounds: Normal breath sounds  Skin:     General: Skin is warm  Neurological:      General: No focal deficit present  Mental Status: She is alert and oriented to person, place, and time           Vital Signs  ED Triage Vitals   Temperature Pulse Respirations Blood Pressure SpO2   01/04/21 1128 01/04/21 1128 01/04/21 1128 01/04/21 1129 01/04/21 1128   97 9 °F (36 6 °C) 90 16 (!) 179/104 97 %      Temp Source Heart Rate Source Patient Position - Orthostatic VS BP Location FiO2 (%)   01/04/21 1128 01/04/21 1128 01/04/21 1129 01/04/21 1129 --   Temporal Monitor Sitting Left arm       Pain Score       01/04/21 1128       8           Vitals:    01/04/21 1128 01/04/21 1129   BP:  (!) 179/104   Pulse: 90    Patient Position - Orthostatic VS:  Sitting         Visual Acuity      ED Medications  Medications   ibuprofen (MOTRIN) tablet 600 mg (600 mg Oral Given 1/4/21 1238)   clindamycin (CLEOCIN) capsule 300 mg (300 mg Oral Given 1/4/21 1238)       Diagnostic Studies  Results Reviewed     None                 No orders to display              Procedures  Procedures         ED Course                                           MDM  Number of Diagnoses or Management Options  Dental abscess:   Facial swelling:   Diagnosis management comments: Dental caries/dental abscess  With facial swelling will start her on clindamycin and ibuprofen  Patient provided referral for dental clinic  Recommended to follow-up  I also recommend to continue with medication at home if the symptom or the patient swelling does not improve return back to the ED  Disposition  Final diagnoses:   Dental abscess   Facial swelling     Time reflects when diagnosis was documented in both MDM as applicable and the Disposition within this note     Time User Action Codes Description Comment    1/4/2021 12:45 PM Hermila Shoaib Add [K04 7] Dental abscess     1/4/2021 12:45 PM Hermila Shoaib Add [R22 0] Facial swelling       ED Disposition     ED Disposition Condition Date/Time Comment    Discharge Stable Mon Jan 4, 2021 12:45 PM Linda Sellers discharge to home/self care  Follow-up Information     Follow up With Specialties Details Why Contact Info    Ignacio Lisa, DO Family Medicine Schedule an appointment as soon as possible for a visit in 2 days If symptoms worsen [address]  [phone]            Discharge Medication List as of 1/4/2021 12:46 PM      START taking these medications    Details   clindamycin (CLEOCIN) 300 MG capsule Take 1 capsule (300 mg total) by mouth 4 (four) times a day for 7 days, Starting Mon 1/4/2021, Until Mon 1/11/2021, Normal         CONTINUE these medications which have NOT CHANGED    Details   citalopram (CeleXA) 20 mg tablet Take 20 mg by mouth daily, Historical Med      QUEtiapine (SEROquel) 100 mg tablet Take 100 mg by mouth daily at bedtime, Historical Med           No discharge procedures on file      PDMP Review     None          ED Provider  Electronically Signed by           Marvel Maynard MD  01/04/21 1557

## 2021-05-13 ENCOUNTER — OFFICE VISIT (OUTPATIENT)
Dept: FAMILY MEDICINE CLINIC | Facility: CLINIC | Age: 36
End: 2021-05-13
Payer: COMMERCIAL

## 2021-05-13 VITALS
HEART RATE: 91 BPM | WEIGHT: 215 LBS | SYSTOLIC BLOOD PRESSURE: 138 MMHG | RESPIRATION RATE: 18 BRPM | OXYGEN SATURATION: 99 % | DIASTOLIC BLOOD PRESSURE: 98 MMHG | HEIGHT: 60 IN | BODY MASS INDEX: 42.21 KG/M2 | TEMPERATURE: 98.5 F

## 2021-05-13 DIAGNOSIS — Z23 ENCOUNTER FOR IMMUNIZATION: ICD-10-CM

## 2021-05-13 DIAGNOSIS — Z11.3 SCREENING EXAMINATION FOR STD (SEXUALLY TRANSMITTED DISEASE): ICD-10-CM

## 2021-05-13 DIAGNOSIS — Z13.220 ENCOUNTER FOR LIPID SCREENING FOR CARDIOVASCULAR DISEASE: ICD-10-CM

## 2021-05-13 DIAGNOSIS — F19.10 IV DRUG ABUSE (HCC): ICD-10-CM

## 2021-05-13 DIAGNOSIS — Z85.41 HISTORY OF CERVICAL CANCER: ICD-10-CM

## 2021-05-13 DIAGNOSIS — N92.6 ABNORMAL MENSTRUAL CYCLE: ICD-10-CM

## 2021-05-13 DIAGNOSIS — R03.0 ELEVATED BLOOD PRESSURE READING: ICD-10-CM

## 2021-05-13 DIAGNOSIS — Z13.6 ENCOUNTER FOR LIPID SCREENING FOR CARDIOVASCULAR DISEASE: ICD-10-CM

## 2021-05-13 DIAGNOSIS — J45.30 MILD PERSISTENT ASTHMA WITHOUT COMPLICATION: Primary | ICD-10-CM

## 2021-05-13 DIAGNOSIS — Z13.29 SCREENING FOR THYROID DISORDER: ICD-10-CM

## 2021-05-13 DIAGNOSIS — Z12.4 SCREENING FOR CERVICAL CANCER: ICD-10-CM

## 2021-05-13 DIAGNOSIS — Z76.89 ENCOUNTER TO ESTABLISH CARE: ICD-10-CM

## 2021-05-13 PROBLEM — F19.91 HISTORY OF DRUG USE: Status: ACTIVE | Noted: 2021-05-13

## 2021-05-13 PROBLEM — Z87.898 HISTORY OF DRUG USE: Status: ACTIVE | Noted: 2021-05-13

## 2021-05-13 PROCEDURE — 90715 TDAP VACCINE 7 YRS/> IM: CPT

## 2021-05-13 PROCEDURE — 99204 OFFICE O/P NEW MOD 45 MIN: CPT | Performed by: FAMILY MEDICINE

## 2021-05-13 PROCEDURE — 90471 IMMUNIZATION ADMIN: CPT

## 2021-05-13 RX ORDER — AMITRIPTYLINE HYDROCHLORIDE 100 MG/1
25 TABLET, FILM COATED ORAL
COMMUNITY
Start: 2021-04-06

## 2021-05-13 RX ORDER — ALBUTEROL SULFATE 90 UG/1
2 AEROSOL, METERED RESPIRATORY (INHALATION) EVERY 6 HOURS PRN
Qty: 8 G | Refills: 5 | Status: SHIPPED | OUTPATIENT
Start: 2021-05-13

## 2021-05-13 NOTE — PROGRESS NOTES
Nell Maevelexus 1985 female MRN: [id number]  Estiven Mitul Faustin [address]    Visit to Establish Care: Family Medicine    Assessment/Plan     No problem-specific Assessment & Plan notes found for this encounter  Grayson Brandt was seen today for establish care  Diagnoses and all orders for this visit:    Mild persistent asthma without complication  -     albuterol (PROVENTIL HFA,VENTOLIN HFA) 90 mcg/act inhaler; Inhale 2 puffs every 6 (six) hours as needed for wheezing or shortness of breath    BMI 40 0-44 9, adult (HCC)    Abnormal menstrual cycle  -     Ambulatory referral to Obstetrics / Gynecology; Future    History of cervical cancer  -     Ambulatory referral to Obstetrics / Gynecology; Future    Elevated blood pressure reading  -     CBC and differential; Future  -     Comprehensive metabolic panel; Future    IV drug abuse ([facility] [place] [address] )  -     HIV 1/2 Antigen/Antibody (4th Generation) w Reflex [facility]; Future  -     Hepatitis C antibody; Future    Encounter for immunization  -     TDAP VACCINE GREATER THAN OR EQUAL TO 6YO IM    Screening for cervical cancer  -     Ambulatory referral to Obstetrics / Gynecology; Future    Encounter for lipid screening for cardiovascular disease  -     Lipid Panel with Direct LDL reflex; Future    Screening for thyroid disorder  -     TSH, 3rd generation with Free T4 reflex; Future    Screening examination for STD (sexually transmitted disease)  -     HIV 1/2 Antigen/Antibody (4th Generation) w Reflex [facility]; Future  -     Hepatitis C antibody; Future  -     Hepatitis B surface antigen; Future  -     RPR; Future  -     Chlamydia/GC amplified DNA by PCR; Future    Encounter to establish care        In addition to the above, the patient was counseled on general preventative health care subjects, including but not limited to:  - Nutrition, healthy weight, aerobic and weight-bearing exercise  - Mental health, social support, and self care    - Advised of the importance of dental hygiene and routine dental visits   - Patient made aware of  services at the office  SUBJECTIVE    HPI:  Luz Elena Grant is a 29 y o  female who presented to establish care with this family medicine practice  She has a history of drug abuse, opioid and meth, last opioid use years ago, last meth use January, discharged from rehab 4/25, 5th time in rehab  Has been without drug use since  History of homelessness, now living with her mother who has custody of her children  She continues to receive treatments through drug and alcohol  History of IV drug use and subsequent blood clots and abscesses due to to this in the past  Multiple scars  History of asthma, she has had some shortness of breath, wheezing and chest tightness with this, more so with her weight gain  No inhaler, needs refill  Refill sent, follow-up further next visit, will discuss ordering PFTs  Elevated blood pressure reading- BP today 138/98  Has been high lately at visits, we discussed monitoring BP at home and close follow-up  Blood work as per orders  She also mentions leg pain/shin splints with walking and in calf muscles sometimes since weight gain  Normal distal pulses today  History of cervical cancer years ago, was recommended to follow-up with oncology and GYN which she then relapsed and was lost to follow-up  She has a history of diverticulosis, has had bouts of abdominal pain in past, and over past couple months has been dealing with this more so  She is tender on exam today without rebound or guarding, normal bowel movements, nausea with eating  No fevers or chills  Follow-up further next visit  Review of Systems   Constitutional: Negative for chills and fever  HENT: Negative for congestion and sore throat  Eyes: Negative for discharge and redness  Respiratory: Positive for chest tightness, shortness of breath and wheezing  Cardiovascular: Negative for chest pain and palpitations  Gastrointestinal: Negative for abdominal pain, nausea and vomiting  Genitourinary: Negative for decreased urine volume, difficulty urinating and dysuria  Musculoskeletal: Negative for gait problem  Skin: Negative for rash  Neurological: Negative for dizziness, light-headedness and headaches  Psychiatric/Behavioral: Positive for dysphoric mood  Negative for suicidal ideas  The patient is nervous/anxious  Historical Information   Past Medical History:   Diagnosis Date    Asthma      Past Surgical History:   Procedure Laterality Date     SECTION      TONSILLECTOMY      WISDOM TOOTH EXTRACTION       No family history on file    Social History     Socioeconomic History    Marital status: Single     Spouse name: Not on file    Number of children: Not on file    Years of education: Not on file    Highest education level: Not on file   Occupational History    Not on file   Social Needs    Financial resource strain: Not on file    Food insecurity     Worry: Not on file     Inability: Not on file    Transportation needs     Medical: Not on file     Non-medical: Not on file   Tobacco Use    Smoking status: Former Smoker    Smokeless tobacco: Never Used   Substance and Sexual Activity    Alcohol use: No    Drug use: Not Currently     Types: Methamphetamines    Sexual activity: Yes   Lifestyle    Physical activity     Days per week: Not on file     Minutes per session: Not on file    Stress: Not on file   Relationships    Social connections     Talks on phone: Not on file     Gets together: Not on file     Attends Taoism service: Not on file     Active member of club or organization: Not on file     Attends meetings of clubs or organizations: Not on file     Relationship status: Not on file    Intimate partner violence     Fear of current or ex partner: Not on file     Emotionally abused: Not on file     Physically abused: Not on file     Forced sexual activity: Not on file Other Topics Concern    Not on file   Social History Narrative    Not on file     OB History    No obstetric history on file  Medications:      Current Outpatient Medications:     amitriptyline (ELAVIL) 100 mg tablet, , Disp: , Rfl:     albuterol (PROVENTIL HFA,VENTOLIN HFA) 90 mcg/act inhaler, Inhale 2 puffs every 6 (six) hours as needed for wheezing or shortness of breath, Disp: 8 g, Rfl: 5    citalopram (CeleXA) 20 mg tablet, Take 20 mg by mouth daily, Disp: , Rfl:     QUEtiapine (SEROquel) 100 mg tablet, Take 100 mg by mouth daily at bedtime, Disp: , Rfl:       Physical Exam:    Physical Exam  Vitals signs reviewed  Constitutional:       General: She is not in acute distress  Appearance: Normal appearance  She is not ill-appearing, toxic-appearing or diaphoretic  HENT:      Head: Normocephalic and atraumatic  Mouth/Throat:      Comments: Mask in place  Eyes:      General:         Right eye: No discharge  Left eye: No discharge  Extraocular Movements: Extraocular movements intact  Conjunctiva/sclera: Conjunctivae normal    Neck:      Musculoskeletal: Normal range of motion and neck supple  No neck rigidity  Cardiovascular:      Rate and Rhythm: Normal rate and regular rhythm  Heart sounds: Normal heart sounds  No murmur  No friction rub  No gallop  Pulmonary:      Effort: Pulmonary effort is normal  No respiratory distress  Breath sounds: Normal breath sounds  No stridor  No wheezing or rhonchi  Abdominal:      General: Bowel sounds are normal  There is no distension  Palpations: Abdomen is soft  There is no mass  Tenderness: There is abdominal tenderness  There is no guarding or rebound  Comments: Nonspecific tenderness mostly LLQ, no rebound or guarding    Musculoskeletal:         General: No swelling, tenderness or signs of injury  Right lower leg: No edema  Left lower leg: No edema  Skin:     General: Skin is warm  Coloration: Skin is not pale  Findings: No erythema or rash  Neurological:      Mental Status: She is alert and oriented to person, place, and time  Motor: No weakness     Psychiatric:         Mood and Affect: Mood normal          Behavior: Behavior normal             Future Appointments   Date Time Provider Herson Ayala   5/27/2021 11:30 AM DO STEPHIE Hurley    [address] [facility]

## 2021-05-14 ENCOUNTER — APPOINTMENT (OUTPATIENT)
Dept: LAB | Facility: CLINIC | Age: 36
End: 2021-05-14
Payer: COMMERCIAL

## 2021-05-14 DIAGNOSIS — F19.10 IV DRUG ABUSE (HCC): ICD-10-CM

## 2021-05-14 DIAGNOSIS — Z13.220 ENCOUNTER FOR LIPID SCREENING FOR CARDIOVASCULAR DISEASE: ICD-10-CM

## 2021-05-14 DIAGNOSIS — Z13.29 SCREENING FOR THYROID DISORDER: ICD-10-CM

## 2021-05-14 DIAGNOSIS — R03.0 ELEVATED BLOOD PRESSURE READING: ICD-10-CM

## 2021-05-14 DIAGNOSIS — Z13.6 ENCOUNTER FOR LIPID SCREENING FOR CARDIOVASCULAR DISEASE: ICD-10-CM

## 2021-05-14 DIAGNOSIS — Z11.3 SCREENING EXAMINATION FOR STD (SEXUALLY TRANSMITTED DISEASE): ICD-10-CM

## 2021-05-14 LAB
ALBUMIN SERPL BCP-MCNC: 3.6 G/DL (ref 3.5–5)
ALP SERPL-CCNC: 78 U/L (ref 46–116)
ALT SERPL W P-5'-P-CCNC: 24 U/L (ref 12–78)
ANION GAP SERPL CALCULATED.3IONS-SCNC: 4 MMOL/L (ref 4–13)
AST SERPL W P-5'-P-CCNC: 19 U/L (ref 5–45)
BASOPHILS # BLD AUTO: 0.04 THOUSANDS/ΜL (ref 0–0.1)
BASOPHILS NFR BLD AUTO: 1 % (ref 0–1)
BILIRUB SERPL-MCNC: 0.54 MG/DL (ref 0.2–1)
BUN SERPL-MCNC: 11 MG/DL (ref 5–25)
CALCIUM SERPL-MCNC: 8.6 MG/DL (ref 8.3–10.1)
CHLORIDE SERPL-SCNC: 107 MMOL/L (ref 100–108)
CHOLEST SERPL-MCNC: 170 MG/DL (ref 50–200)
CO2 SERPL-SCNC: 27 MMOL/L (ref 21–32)
CREAT SERPL-MCNC: 0.71 MG/DL (ref 0.6–1.3)
EOSINOPHIL # BLD AUTO: 0.08 THOUSAND/ΜL (ref 0–0.61)
EOSINOPHIL NFR BLD AUTO: 2 % (ref 0–6)
ERYTHROCYTE [DISTWIDTH] IN BLOOD BY AUTOMATED COUNT: 12.5 % (ref 11.6–15.1)
GFR SERPL CREATININE-BSD FRML MDRD: 111 ML/MIN/1.73SQ M
GLUCOSE P FAST SERPL-MCNC: 105 MG/DL (ref 65–99)
HBV SURFACE AG SER QL: NORMAL
HCT VFR BLD AUTO: 40.6 % (ref 34.8–46.1)
HCV AB SER QL: NORMAL
HDLC SERPL-MCNC: 63 MG/DL
HGB BLD-MCNC: 13.3 G/DL (ref 11.5–15.4)
IMM GRANULOCYTES # BLD AUTO: 0.02 THOUSAND/UL (ref 0–0.2)
IMM GRANULOCYTES NFR BLD AUTO: 0 % (ref 0–2)
LDLC SERPL CALC-MCNC: 94 MG/DL (ref 0–100)
LYMPHOCYTES # BLD AUTO: 1.26 THOUSANDS/ΜL (ref 0.6–4.47)
LYMPHOCYTES NFR BLD AUTO: 24 % (ref 14–44)
MCH RBC QN AUTO: 29 PG (ref 26.8–34.3)
MCHC RBC AUTO-ENTMCNC: 32.8 G/DL (ref 31.4–37.4)
MCV RBC AUTO: 89 FL (ref 82–98)
MONOCYTES # BLD AUTO: 0.62 THOUSAND/ΜL (ref 0.17–1.22)
MONOCYTES NFR BLD AUTO: 12 % (ref 4–12)
NEUTROPHILS # BLD AUTO: 3.24 THOUSANDS/ΜL (ref 1.85–7.62)
NEUTS SEG NFR BLD AUTO: 61 % (ref 43–75)
NRBC BLD AUTO-RTO: 0 /100 WBCS
PLATELET # BLD AUTO: 261 THOUSANDS/UL (ref 149–390)
PMV BLD AUTO: 10.4 FL (ref 8.9–12.7)
POTASSIUM SERPL-SCNC: 3.9 MMOL/L (ref 3.5–5.3)
PROT SERPL-MCNC: 7.1 G/DL (ref 6.4–8.2)
RBC # BLD AUTO: 4.59 MILLION/UL (ref 3.81–5.12)
SODIUM SERPL-SCNC: 138 MMOL/L (ref 136–145)
TRIGL SERPL-MCNC: 67 MG/DL
TSH SERPL DL<=0.05 MIU/L-ACNC: 1.33 UIU/ML (ref 0.36–3.74)
WBC # BLD AUTO: 5.26 THOUSAND/UL (ref 4.31–10.16)

## 2021-05-14 PROCEDURE — 87389 HIV-1 AG W/HIV-1&-2 AB AG IA: CPT

## 2021-05-14 PROCEDURE — 84443 ASSAY THYROID STIM HORMONE: CPT

## 2021-05-14 PROCEDURE — 80061 LIPID PANEL: CPT

## 2021-05-14 PROCEDURE — 87491 CHLMYD TRACH DNA AMP PROBE: CPT

## 2021-05-14 PROCEDURE — 36415 COLL VENOUS BLD VENIPUNCTURE: CPT

## 2021-05-14 PROCEDURE — 80053 COMPREHEN METABOLIC PANEL: CPT

## 2021-05-14 PROCEDURE — 85025 COMPLETE CBC W/AUTO DIFF WBC: CPT

## 2021-05-14 PROCEDURE — 86803 HEPATITIS C AB TEST: CPT

## 2021-05-14 PROCEDURE — 87591 N.GONORRHOEAE DNA AMP PROB: CPT

## 2021-05-14 PROCEDURE — 87340 HEPATITIS B SURFACE AG IA: CPT

## 2021-05-14 PROCEDURE — 86592 SYPHILIS TEST NON-TREP QUAL: CPT

## 2021-05-15 LAB
C TRACH DNA SPEC QL NAA+PROBE: NEGATIVE
N GONORRHOEA DNA SPEC QL NAA+PROBE: NEGATIVE

## 2021-05-16 LAB — HIV 1+2 AB+HIV1 P24 AG SERPL QL IA: NORMAL

## 2021-05-17 LAB — RPR SER QL: NORMAL

## 2021-05-27 ENCOUNTER — OFFICE VISIT (OUTPATIENT)
Dept: FAMILY MEDICINE CLINIC | Facility: CLINIC | Age: 36
End: 2021-05-27
Payer: COMMERCIAL

## 2021-05-27 VITALS
HEART RATE: 94 BPM | RESPIRATION RATE: 20 BRPM | SYSTOLIC BLOOD PRESSURE: 140 MMHG | DIASTOLIC BLOOD PRESSURE: 88 MMHG | WEIGHT: 207 LBS | TEMPERATURE: 99.5 F | BODY MASS INDEX: 40.64 KG/M2 | OXYGEN SATURATION: 99 % | HEIGHT: 60 IN

## 2021-05-27 DIAGNOSIS — S09.90XA INJURY OF HEAD, INITIAL ENCOUNTER: ICD-10-CM

## 2021-05-27 DIAGNOSIS — R73.01 ELEVATED FASTING GLUCOSE: Primary | ICD-10-CM

## 2021-05-27 DIAGNOSIS — H92.02 LEFT EAR PAIN: ICD-10-CM

## 2021-05-27 DIAGNOSIS — M79.641 PAIN IN RIGHT HAND: ICD-10-CM

## 2021-05-27 DIAGNOSIS — I10 ESSENTIAL HYPERTENSION: ICD-10-CM

## 2021-05-27 PROCEDURE — 1036F TOBACCO NON-USER: CPT | Performed by: FAMILY MEDICINE

## 2021-05-27 PROCEDURE — 3008F BODY MASS INDEX DOCD: CPT | Performed by: FAMILY MEDICINE

## 2021-05-27 PROCEDURE — 3725F SCREEN DEPRESSION PERFORMED: CPT | Performed by: FAMILY MEDICINE

## 2021-05-27 PROCEDURE — 99214 OFFICE O/P EST MOD 30 MIN: CPT | Performed by: FAMILY MEDICINE

## 2021-05-27 RX ORDER — PRAZOSIN HYDROCHLORIDE 2 MG/1
4 CAPSULE ORAL
COMMUNITY
Start: 2021-05-24

## 2021-05-27 RX ORDER — CIPROFLOXACIN AND DEXAMETHASONE 3; 1 MG/ML; MG/ML
4 SUSPENSION/ DROPS AURICULAR (OTIC) 2 TIMES DAILY
Qty: 7.5 ML | Refills: 0 | Status: SHIPPED | OUTPATIENT
Start: 2021-05-27

## 2021-05-27 RX ORDER — AMITRIPTYLINE HYDROCHLORIDE 25 MG/1
TABLET, FILM COATED ORAL
COMMUNITY
Start: 2021-05-24

## 2021-05-27 RX ORDER — VORTIOXETINE 5 MG/1
TABLET, FILM COATED ORAL
COMMUNITY
Start: 2021-05-24

## 2021-05-27 NOTE — ASSESSMENT & PLAN NOTE
- BP above goal, 140/88 today, asymptomatic   - Just started on Prazosin, would like to watch home monitoring over next 2 weeks prior to adding additional medication   - Close follow-up 2 weeks

## 2021-05-27 NOTE — ASSESSMENT & PLAN NOTE
- Tenderness over anatomical snuff box right, suspicion for fracture in setting of recent trauma   - Will obtain XR
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
## 2021-05-27 NOTE — ASSESSMENT & PLAN NOTE
- Ear pain, intermittent headaches worse with stress, chronic vision changes without recent change, occasional lightheadedness/dizziness  - Normal neurologic exam without focal deficit except for difficulty following finger with EOM testing  - [place] CT Head Injury/Trauma score- Low risk, CT unnecessary   - Advised may have mild concussion, avoid activities that make symptoms worse   - Call with any new or worsening symptoms or seek emergency care

## 2021-05-27 NOTE — ASSESSMENT & PLAN NOTE
- Evidence of otitis externa on exam, Ciprodex  - Advised to use Flonase to decrease effusion/help with swelling

## 2021-05-27 NOTE — PROGRESS NOTES
ADULT ANNUAL [facility]    NAME: Jonel Wright  AGE: 29 y o  SEX: female  : 1985     DATE: 2021     Assessment and Plan:     Problem List Items Addressed This Visit     None      Visit Diagnoses     Encounter for immunization    -  Primary    Annual physical exam              Immunizations and preventive care screenings were discussed with patient today  Appropriate education was printed on patient's after visit summary  Counseling:  Alcohol/drug use: discussed moderation in alcohol intake, the recommendations for healthy alcohol use, and avoidance of illicit drug use  Dental Health: discussed importance of regular tooth brushing, flossing, and dental visits  Injury prevention: discussed safety/seat belts, safety helmets, smoke detectors, carbon dioxide detectors, and smoking near bedding or upholstery  Sexual health: discussed sexually transmitted diseases, partner selection, use of condoms, avoidance of unintended pregnancy, and contraceptive alternatives  · Exercise: the importance of regular exercise/physical activity was discussed  Recommend exercise 3-5 times per week for at least 30 minutes  No follow-ups on file  Chief Complaint:     Chief Complaint   Patient presents with    Physical Exam     follow up      History of Present Illness:     Adult Annual Physical   Patient here for a comprehensive physical exam  The patient reports- see problem focused note  Diet and Physical Activity  · Diet/Nutrition: {annual physical; diet:15958107}  · Exercise: {annual physical; exercise:54556546}        Depression Screening  PHQ-9 Depression Screening    PHQ-9:   Frequency of the following problems over the past two weeks:      Little interest or pleasure in doing things: 1 - several days  Feeling down, depressed, or hopeless: 1 - several days  PHQ-2 Score: 2       General Health  · Sleep: {annual physical; sleep:2102}  · Hearing: {annual physical; hearin}  · Vision: {annual physical; vision:}  · Dental: {annual physical; dental:}  /GYN Health  · Last menstrual period: ***  · Contraceptive method: {contraceptive options:}  · History of STDs?: {YES/NO:}  Review of Systems:     Review of Systems - see problem focused note  Past Medical History:     Past Medical History:   Diagnosis Date    Asthma       Past Surgical History:     Past Surgical History:   Procedure Laterality Date     SECTION      TONSILLECTOMY      WISDOM TOOTH EXTRACTION        Social History:        Social History     Socioeconomic History    Marital status: Single     Spouse name: None    Number of children: None    Years of education: None    Highest education level: None   Occupational History    None   Social Needs    Financial resource strain: None    Food insecurity     Worry: None     Inability: None    Transportation needs     Medical: None     Non-medical: None   Tobacco Use    Smoking status: Former Smoker    Smokeless tobacco: Never Used   Substance and Sexual Activity    Alcohol use: No    Drug use: Not Currently     Types: Methamphetamines    Sexual activity: Yes   Lifestyle    Physical activity     Days per week: None     Minutes per session: None    Stress: None   Relationships    Social connections     Talks on phone: None     Gets together: None     Attends Anabaptism service: None     Active member of club or organization: None     Attends meetings of clubs or organizations: None     Relationship status: None    Intimate partner violence     Fear of current or ex partner: None     Emotionally abused: None     Physically abused: None     Forced sexual activity: None   Other Topics Concern    None   Social History Narrative    None      Family History:     History reviewed  No pertinent family history     Current Medications:     Current Outpatient Medications   Medication Sig Dispense Refill    albuterol (PROVENTIL HFA,VENTOLIN HFA) 90 mcg/act inhaler Inhale 2 puffs every 6 (six) hours as needed for wheezing or shortness of breath 8 g 5    amitriptyline (ELAVIL) 25 mg tablet TAKE 2 TABLETS BY MOUTH DAILY FOR 3 DAYS, THEN TAKE 1 TABLET DAILY FOR 3 DAYS, THEN DISCONTINUE      prazosin (MINIPRESS) 2 mg capsule Take 4 mg by mouth daily at bedtime      QUEtiapine (SEROquel) 100 mg tablet Take 25 mg by mouth daily at bedtime       Trintellix 5 MG tablet       amitriptyline (ELAVIL) 100 mg tablet 25 mg       citalopram (CeleXA) 20 mg tablet Take 20 mg by mouth daily       No current facility-administered medications for this visit  Allergies: Allergies   Allergen Reactions    Amoxicillin     Penicillins Hives    Vistaril [Hydroxyzine] Hives      Physical Exam:     /88 (BP Location: Left arm, Patient Position: Sitting, Cuff Size: Standard)   Pulse 94   Temp 99 5 °F (37 5 °C)   Resp 20   Ht 5' (1 524 m)   Wt 93 9 kg (207 lb)   LMP 05/12/2021   SpO2 99%   BMI 40 43 kg/m²     Physical Exam - see problem focused note       [facility]

## 2022-11-08 ENCOUNTER — HOSPITAL ENCOUNTER (EMERGENCY)
Facility: HOSPITAL | Age: 37
Discharge: HOME | End: 2022-11-08
Attending: STUDENT IN AN ORGANIZED HEALTH CARE EDUCATION/TRAINING PROGRAM

## 2022-11-08 ENCOUNTER — APPOINTMENT (EMERGENCY)
Dept: CT IMAGING | Facility: HOSPITAL | Age: 37
End: 2022-11-08

## 2022-11-08 VITALS
RESPIRATION RATE: 17 BRPM | HEART RATE: 67 BPM | TEMPERATURE: 97.6 F | DIASTOLIC BLOOD PRESSURE: 78 MMHG | BODY MASS INDEX: 37.69 KG/M2 | OXYGEN SATURATION: 100 % | WEIGHT: 193 LBS | SYSTOLIC BLOOD PRESSURE: 127 MMHG

## 2022-11-08 DIAGNOSIS — Z03.823 ENCOUNTER FOR OBSERVATION FOR SUSPECTED INSERTED (INJECTED) FOREIGN BODY RULED OUT: Primary | ICD-10-CM

## 2022-11-09 NOTE — ED NOTES
[facility] staff updated on status     Ebenezer Pittman, [address] [facility]  11/08/22 [id number]

## 2022-11-09 NOTE — ED PROVIDER NOTES
History  Chief Complaint   Patient presents with   • Foreign Body in Rectum     Pt here for possible foreign body in rectum or vagina as seen on imaging brought in by corrections officers  Blood in urine      HPI this is a 41-year-old female who presents to the emergency department from [facility]  Patient is accompanied by corrections officers who states on routine intake examination there was and abnormality on imaging modalities concerning for foreign body in the rectum/anus  Patient denies any knowledge of this foreign body denies place any objects into the rectum or anus  Patient denies any issues or concerns upon my evaluation    Prior to Admission Medications   Prescriptions Last Dose Informant Patient Reported? Taking? QUEtiapine (SEROquel) 100 mg tablet   Yes No   Sig: Take 25 mg by mouth daily at bedtime    Trintellix 5 MG tablet   Yes No   albuterol (PROVENTIL HFA,VENTOLIN HFA) 90 mcg/act inhaler   No No   Sig: Inhale 2 puffs every 6 (six) hours as needed for wheezing or shortness of breath   amitriptyline (ELAVIL) 100 mg tablet   Yes No   Si mg    amitriptyline (ELAVIL) 25 mg tablet   Yes No   Sig: TAKE 2 TABLETS BY MOUTH DAILY FOR 3 DAYS, THEN TAKE 1 TABLET DAILY FOR 3 DAYS, THEN DISCONTINUE   ciprofloxacin-dexamethasone (CIPRODEX) otic suspension   No No   Sig: Administer 4 drops into the left ear 2 (two) times a day   citalopram (CeleXA) 20 mg tablet   Yes No   Sig: Take 20 mg by mouth daily   prazosin (MINIPRESS) 2 mg capsule   Yes No   Sig: Take 4 mg by mouth daily at bedtime      Facility-Administered Medications: None       Past Medical History:   Diagnosis Date   • Asthma        Past Surgical History:   Procedure Laterality Date   •  SECTION     • TONSILLECTOMY     • WISDOM TOOTH EXTRACTION         History reviewed  No pertinent family history  I have reviewed and agree with the history as documented      E-Cigarette/Vaping   • E-Cigarette Use Current Every Day User      E-Cigarette/Vaping Substances   • Nicotine Yes    • Flavoring Yes      Social History     Tobacco Use   • Smoking status: Former Smoker   • Smokeless tobacco: Never Used   Vaping Use   • Vaping Use: Every day   • Substances: Nicotine, Flavoring   Substance Use Topics   • Alcohol use: No   • Drug use: Not Currently     Frequency: 4 0 times per week     Types: Methamphetamines     Comment: 2 days ago       Review of Systems   Constitutional: Negative for activity change, appetite change, chills, fatigue and fever  HENT: Negative for congestion, dental problem, drooling, ear discharge, ear pain, facial swelling, postnasal drip, rhinorrhea and sinus pain  Eyes: Negative for photophobia, pain, discharge and itching  Respiratory: Negative for apnea, cough, chest tightness and shortness of breath  Cardiovascular: Negative for chest pain and leg swelling  Gastrointestinal: Negative for abdominal distention, abdominal pain, anal bleeding, constipation, diarrhea and nausea  Endocrine: Negative for cold intolerance, heat intolerance and polydipsia  Genitourinary: Negative for difficulty urinating  Musculoskeletal: Negative for arthralgias, gait problem, joint swelling and myalgias  Skin: Negative for color change and pallor  Allergic/Immunologic: Negative for immunocompromised state  Neurological: Negative for dizziness, seizures, facial asymmetry, weakness, light-headedness, numbness and headaches  Psychiatric/Behavioral: Negative for agitation, behavioral problems, confusion, decreased concentration and dysphoric mood  All other systems reviewed and are negative  Physical Exam  Physical Exam  Vitals and nursing note reviewed  Constitutional:       General: She is not in acute distress  Appearance: She is well-developed  HENT:      Head: Normocephalic and atraumatic     Eyes:      Conjunctiva/sclera: Conjunctivae normal       Pupils: Pupils are equal, round, and reactive to light  Cardiovascular:      Rate and Rhythm: Normal rate and regular rhythm  Heart sounds: Normal heart sounds  No murmur heard  No friction rub  Pulmonary:      Effort: Pulmonary effort is normal       Breath sounds: Normal breath sounds  Abdominal:      General: Bowel sounds are normal       Palpations: Abdomen is soft  Musculoskeletal:         General: Normal range of motion  Cervical back: Normal range of motion and neck supple  Skin:     General: Skin is warm  Capillary Refill: Capillary refill takes less than 2 seconds  Neurological:      Mental Status: She is alert and oriented to person, place, and time  Motor: No abnormal muscle tone  Coordination: Coordination normal    Psychiatric:         Behavior: Behavior normal          Thought Content: Thought content normal          Vital Signs  ED Triage Vitals [11/08/22 1939]   Temperature Pulse Respirations Blood Pressure SpO2   97 6 °F (36 4 °C) 80 17 135/81 100 %      Temp Source Heart Rate Source Patient Position - Orthostatic VS BP Location FiO2 (%)   Tympanic Monitor -- -- --      Pain Score       2           Vitals:    11/08/22 1939 11/08/22 2200   BP: 135/81 127/78   Pulse: 80 67         Visual Acuity      ED Medications  Medications - No data to display    Diagnostic Studies  Results Reviewed     None                 CT abdomen pelvis wo contrast   Final Result by Venancio William MD (11/08 2156)         No renal stones  No small bowel obstruction  Appendix not visualized  Consider contrast exam in the appropriate clinical setting  Workstation performed: [id number]                    Procedures  Procedures         ED Course  ED Course as of 11/09/22 0738   e Nov 08, 2022 1956 Copies of imaging from correction facility have been uploaded to the chart by registration under the media tab     2046 CT reviewed no obvious foreign bodies in the abdomen/pelvis   2049 S/o to Dr Camila Stuart formal read of CT, if negative patient can be d/c                                SBIRT 22yo+    Flowsheet Row Most Recent Value   SBIRT (25 yo +)    In order to provide better care to our patients, we are screening all of our patients for alcohol and drug use  Would it be okay to ask you these screening questions? No Filed at: 11/08/2022 2036                    [facility]  Number of Diagnoses or Management Options     Amount and/or Complexity of Data Reviewed  Clinical lab tests: reviewed  Tests in the radiology section of CPT®: ordered  Tests in the medicine section of CPT®: reviewed  Decide to obtain previous medical records or to obtain history from someone other than the patient: yes  Review and summarize past medical records: yes  Discuss the patient with other providers: yes  Independent visualization of images, tracings, or specimens: yes    Risk of Complications, Morbidity, and/or Mortality  Presenting problems: moderate  Diagnostic procedures: moderate  Management options: moderate  General comments: 71-year-old female presents from correction facility for concerns of foreign body in rectum/vagina  They did provide me with a copy other imaging which is nondiagnostic  I did review the films with a  in the pointed out the concerning finding  However on my evaluation I am not convinced that this is truly foreign body may actually represent artifact  Nonetheless given situation will proceed with a CT scan of the pelvis to fully assess for foreign body    Discharge pending CT scan        Disposition  Final diagnoses:   Encounter for observation for suspected inserted (injected) foreign body ruled out     Time reflects when diagnosis was documented in both MDM as applicable and the Disposition within this note     Time User Action Codes Description Comment    11/8/2022  8:44 PM Kimberly Jain Add [O27 [id number]] Suspected injected foreign body not found after observation     11/8/2022  8:50 PM Rashaad Tommy Tristan [Q57 [id number]] Suspected injected foreign body not found after observation     11/8/2022  8:51 PM Ayla Neil [[id number]] Encounter for observation for suspected inserted (injected) foreign body ruled out       ED Disposition     ED Disposition   Discharge    Condition   Stable    Date/Time   Tue Nov 8, 2022 10:03 PM    Comment   Jignesh Stuart discharge to home/self care  Follow-up Information     Follow up With Specialties Details Why [facility], DO Family Medicine Call   [address] [facility]  [address] [facility] [address]    [address]  [phone]            Discharge Medication List as of 11/8/2022 10:03 PM      CONTINUE these medications which have NOT CHANGED    Details   albuterol (PROVENTIL HFA,VENTOLIN HFA) 90 mcg/act inhaler Inhale 2 puffs every 6 (six) hours as needed for wheezing or shortness of breath, Starting Thu 5/13/2021, Normal      !! amitriptyline (ELAVIL) 100 mg tablet 25 mg , Starting Tue 4/6/2021, Historical Med      !! amitriptyline (ELAVIL) 25 mg tablet TAKE 2 TABLETS BY MOUTH DAILY FOR 3 DAYS, THEN TAKE 1 TABLET DAILY FOR 3 DAYS, THEN DISCONTINUE, Historical Med      ciprofloxacin-dexamethasone (CIPRODEX) otic suspension Administer 4 drops into the left ear 2 (two) times a day, Starting Thu 5/27/2021, Normal      citalopram (CeleXA) 20 mg tablet Take 20 mg by mouth daily, Historical Med      prazosin (MINIPRESS) 2 mg capsule Take 4 mg by mouth daily at bedtime, Starting Mon 5/24/2021, Historical Med      QUEtiapine (SEROquel) 100 mg tablet Take 25 mg by mouth daily at bedtime , Historical Med      Trintellix 5 MG tablet Starting Mon 5/24/2021, Historical Med       !! - Potential duplicate medications found  Please discuss with provider  No discharge procedures on file      PDMP Review     None          ED Provider  Electronically Signed by           Zonia Gipson MD  11/09/22 0586

## 2022-12-12 ENCOUNTER — VBI (OUTPATIENT)
Dept: ADMINISTRATIVE | Facility: OTHER | Age: 37
End: 2022-12-12

## 2023-03-03 ENCOUNTER — OFFICE VISIT (OUTPATIENT)
Dept: URGENT CARE | Facility: CLINIC | Age: 38
End: 2023-03-03

## 2023-03-03 ENCOUNTER — APPOINTMENT (OUTPATIENT)
Dept: RADIOLOGY | Facility: CLINIC | Age: 38
End: 2023-03-03

## 2023-03-03 VITALS
HEIGHT: 60 IN | BODY MASS INDEX: 44.17 KG/M2 | SYSTOLIC BLOOD PRESSURE: 136 MMHG | RESPIRATION RATE: 18 BRPM | OXYGEN SATURATION: 99 % | DIASTOLIC BLOOD PRESSURE: 76 MMHG | HEART RATE: 86 BPM | WEIGHT: 225 LBS | TEMPERATURE: 98 F

## 2023-03-03 DIAGNOSIS — M54.50 ACUTE MIDLINE LOW BACK PAIN WITHOUT SCIATICA: Primary | ICD-10-CM

## 2023-03-03 DIAGNOSIS — M54.50 ACUTE MIDLINE LOW BACK PAIN WITHOUT SCIATICA: ICD-10-CM

## 2023-03-03 LAB
SL AMB  POCT GLUCOSE, UA: ABNORMAL
SL AMB LEUKOCYTE ESTERASE,UA: ABNORMAL
SL AMB POCT BILIRUBIN,UA: ABNORMAL
SL AMB POCT BLOOD,UA: ABNORMAL
SL AMB POCT CLARITY,UA: ABNORMAL
SL AMB POCT COLOR,UA: YELLOW
SL AMB POCT KETONES,UA: ABNORMAL
SL AMB POCT NITRITE,UA: ABNORMAL
SL AMB POCT PH,UA: 6
SL AMB POCT SPECIFIC GRAVITY,UA: 1
SL AMB POCT URINE PROTEIN: ABNORMAL
SL AMB POCT UROBILINOGEN: 0.2

## 2023-03-03 RX ORDER — METHOCARBAMOL 500 MG/1
500 TABLET, FILM COATED ORAL 3 TIMES DAILY PRN
Qty: 10 TABLET | Refills: 0 | Status: SHIPPED | OUTPATIENT
Start: 2023-03-03

## 2023-03-03 RX ORDER — METHYLPREDNISOLONE 4 MG/1
TABLET ORAL
Qty: 21 TABLET | Refills: 0 | Status: SHIPPED | OUTPATIENT
Start: 2023-03-03

## 2023-03-03 NOTE — PATIENT INSTRUCTIONS
Take medication as directed  Do not take robaxin with alcohol or before driving  If you develop any increased pain, numbness, tingling, weakness or any new or concerning symptoms please return or proceed to ER   Follow

## 2023-03-03 NOTE — PROGRESS NOTES
[address]        NAME: Branden Banks is a 41 y o  female  : 1985    MRN: [id number]  DATE: March 3, 2023  TIME: 9:39 AM    Assessment and Plan   Acute midline low back pain without sciatica [M54 50]  1  Acute midline low back pain without sciatica  POCT urine dip    XR spine lumbar 2 or 3 views injury    Urine culture    methylPREDNISolone 4 MG tablet therapy pack    methocarbamol (ROBAXIN) 500 mg tablet            Patient Instructions     Patient Instructions   Take medication as directed  Do not take robaxin with alcohol or before driving  If you develop any increased pain, numbness, tingling, weakness or any new or concerning symptoms please return or proceed to ER  Follow     Follow up with PCP in 3-5 days  Proceed to the ER if symptoms worsen  Chief Complaint     Chief Complaint   Patient presents with   • Back Pain     Started last week after helping someone move  Lower left back  Tingling down both arms to numbness to hands  Occasionally feeling light headed  History of Present Illness       Back Pain  This is a new problem  The current episode started in the past 7 days (began after helping a friend move 1 week ago)  The problem occurs constantly  The problem has been gradually worsening since onset  The pain is present in the lumbar spine  The quality of the pain is described as stabbing and aching  The pain does not radiate  The pain is moderate  The symptoms are aggravated by bending and twisting  Associated symptoms include numbness (occasional in bilateral arms)  Pertinent negatives include no abdominal pain, bladder incontinence, bowel incontinence, chest pain, dysuria, fever, headaches, paresthesias, perianal numbness, tingling or weakness  She has tried NSAIDs for the symptoms  The treatment provided mild relief  Review of Systems   Review of Systems   Constitutional: Negative for chills, diaphoresis, fatigue and fever     Respiratory: Negative for cough, chest tightness and shortness of breath  Cardiovascular: Negative for chest pain  Gastrointestinal: Negative for abdominal pain, blood in stool, bowel incontinence, diarrhea, nausea and vomiting  Genitourinary: Negative for bladder incontinence, decreased urine volume, difficulty urinating, dysuria, flank pain, frequency, hematuria and urgency  Musculoskeletal: Positive for back pain  Negative for arthralgias, gait problem, joint swelling, myalgias, neck pain and neck stiffness  Skin: Negative for rash  Neurological: Positive for numbness (occasional in bilateral arms)  Negative for dizziness, tingling, tremors, seizures, syncope, facial asymmetry, speech difficulty, weakness, light-headedness, headaches and paresthesias           Current Medications       Current Outpatient Medications:   •  albuterol (PROVENTIL HFA,VENTOLIN HFA) 90 mcg/act inhaler, Inhale 2 puffs every 6 (six) hours as needed for wheezing or shortness of breath, Disp: 8 g, Rfl: 5  •  methocarbamol (ROBAXIN) 500 mg tablet, Take 1 tablet (500 mg total) by mouth 3 (three) times a day as needed for muscle spasms, Disp: 10 tablet, Rfl: 0  •  methylPREDNISolone 4 MG tablet therapy pack, Use as directed on package, Disp: 21 tablet, Rfl: 0  •  prazosin (MINIPRESS) 2 mg capsule, Take 4 mg by mouth daily at bedtime, Disp: , Rfl:   •  QUEtiapine (SEROquel) 100 mg tablet, Take 25 mg by mouth daily at bedtime , Disp: , Rfl:   •  Trintellix 5 MG tablet, , Disp: , Rfl:   •  amitriptyline (ELAVIL) 100 mg tablet, 25 mg  (Patient not taking: Reported on 3/3/2023), Disp: , Rfl:   •  amitriptyline (ELAVIL) 25 mg tablet, TAKE 2 TABLETS BY MOUTH DAILY FOR 3 DAYS, THEN TAKE 1 TABLET DAILY FOR 3 DAYS, THEN DISCONTINUE (Patient not taking: Reported on 3/3/2023), Disp: , Rfl:   •  ciprofloxacin-dexamethasone (CIPRODEX) otic suspension, Administer 4 drops into the left ear 2 (two) times a day (Patient not taking: Reported on 3/3/2023), Disp: 7 5 mL, Rfl: 0  • citalopram (CeleXA) 20 mg tablet, Take 20 mg by mouth daily (Patient not taking: Reported on 3/3/2023), Disp: , Rfl:     Current Allergies     Allergies as of 2023 - Reviewed 2023   Allergen Reaction Noted   • Lamictal [lamotrigine] Fever 2023   • Amoxicillin Hives 2016   • Penicillins Hives 2016   • Vistaril [hydroxyzine] Hives 2021            The following portions of the patient's history were reviewed and updated as appropriate: allergies, current medications, past family history, past medical history, past social history, past surgical history and problem list      Past Medical History:   Diagnosis Date   • Asthma        Past Surgical History:   Procedure Laterality Date   •  SECTION     • TONSILLECTOMY     • WISDOM TOOTH EXTRACTION         No family history on file  Medications have been verified  Objective   /76   Pulse 86   Temp 98 °F (36 7 °C)   Resp 18   Ht 5' (1 524 m)   Wt 102 kg (225 lb)   SpO2 99%   BMI 43 94 kg/m²   No LMP recorded  Physical Exam     Physical Exam  Constitutional:       General: She is not in acute distress  Appearance: Normal appearance  She is well-developed  She is not diaphoretic  Cardiovascular:      Rate and Rhythm: Normal rate and regular rhythm  Heart sounds: Normal heart sounds  Pulmonary:      Effort: Pulmonary effort is normal       Breath sounds: Normal breath sounds  Abdominal:      General: Bowel sounds are normal  There is no distension  Palpations: Abdomen is soft  Abdomen is not rigid  There is no mass  Tenderness: There is no abdominal tenderness  There is no right CVA tenderness, left CVA tenderness, guarding or rebound  Negative signs include Reyna's sign and McBurney's sign  Musculoskeletal:      Cervical back: Normal       Thoracic back: Normal       Lumbar back: Tenderness and bony tenderness present  No swelling, edema, deformity or lacerations   Decreased range of motion  Negative right straight leg raise test and negative left straight leg raise test    Skin:     General: Skin is warm and dry  Capillary Refill: Capillary refill takes less than 2 seconds  Neurological:      Mental Status: She is alert and oriented to person, place, and time

## 2023-03-05 LAB — BACTERIA UR CULT: NORMAL

## 2023-04-02 ENCOUNTER — OFFICE VISIT (OUTPATIENT)
Dept: URGENT CARE | Facility: CLINIC | Age: 38
End: 2023-04-02

## 2023-04-02 VITALS
TEMPERATURE: 99.1 F | OXYGEN SATURATION: 99 % | SYSTOLIC BLOOD PRESSURE: 134 MMHG | WEIGHT: 225 LBS | BODY MASS INDEX: 44.17 KG/M2 | RESPIRATION RATE: 18 BRPM | DIASTOLIC BLOOD PRESSURE: 64 MMHG | HEIGHT: 60 IN | HEART RATE: 88 BPM

## 2023-04-02 DIAGNOSIS — J02.9 SORE THROAT: Primary | ICD-10-CM

## 2023-04-02 DIAGNOSIS — B34.9 VIRAL SYNDROME: ICD-10-CM

## 2023-04-02 DIAGNOSIS — R05.1 ACUTE COUGH: ICD-10-CM

## 2023-04-02 LAB — S PYO AG THROAT QL: NEGATIVE

## 2023-04-02 RX ORDER — BENZONATATE 100 MG/1
100 CAPSULE ORAL 3 TIMES DAILY PRN
Qty: 9 CAPSULE | Refills: 0 | Status: SHIPPED | OUTPATIENT
Start: 2023-04-02

## 2023-04-02 RX ORDER — DULOXETIN HYDROCHLORIDE 20 MG/1
20 CAPSULE, DELAYED RELEASE ORAL DAILY
COMMUNITY
Start: 2023-03-13

## 2023-04-02 NOTE — PATIENT INSTRUCTIONS
Rapid strep negative, throat culture pending  COVID/flu swab collected today, test results pending  Tessalon perles as prescribed for cough if needed  Test results are available between 24 and 48 hours  Your results will come through [address]  Check MyChart and call if needed for test results  Quarantine guidelines discussed  OTC supplements/medications discussed  Follow-up with PCP in the next 1-2 days for re-evaluation  Go to the ED if any fevers, unable to stay hydrated, abdominal pain, chest pain, shortness of breath, wheezing, chest tightness, headaches, dizziness, weakness, new or worsening symptoms or other concerning symptoms

## 2023-04-02 NOTE — PROGRESS NOTES
"  [address]        NAME: Ileana Gr is a 41 y o  female  : 1985    MRN: [id number]  DATE: 2023  TIME: 1:24 PM    Assessment and Plan   Sore throat [J02 9]  1  Sore throat  POCT rapid strepA    Throat culture      2  Acute cough  benzonatate (TESSALON PERLES) 100 mg capsule    Covid/Flu-Office Collect      3  Viral syndrome  benzonatate (TESSALON PERLES) 100 mg capsule        Rapid strep negative, throat cx pending  Covid/flu swab pending      Patient Instructions     Rapid strep negative, throat culture pending  COVID/flu swab collected today, test results pending  Tessalon perles as prescribed for cough if needed  Test results are available between 24 and 48 hours  Your results will come through [address]  Check MyChart and call if needed for test results  Quarantine guidelines discussed  OTC supplements/medications discussed  Follow-up with PCP in the next 1-2 days for re-evaluation  Go to the ED if any fevers, unable to stay hydrated, abdominal pain, chest pain, shortness of breath, wheezing, chest tightness, headaches, dizziness, weakness, new or worsening symptoms or other concerning symptoms  Chief Complaint     Chief Complaint   Patient presents with   • Cough     Started yesterday with cough  Today temp of 99 9 @ home  History of Present Illness       36-year-old female presents for evaluation of runny nose/nasal congestion with postnasal drip, sore throat, mild intermittent cough since yesterday  Has tried over-the-counter cough/cold medication with some improvement  Has been having fevers as high as 99 9 degrees with chills and mild generalized body aches  States sick contacts/family members diagnosed with \"upper respiratory infection\"  Eating and drinking normally  Denies any chest pain, chest tightness, shortness of breath, wheezing, GI/ symptoms or other complaints  Denies any pregnancy risk        Review of Systems   Review of Systems " Constitutional: Positive for chills and fever  Negative for activity change, appetite change and fatigue  HENT: Positive for congestion, postnasal drip, rhinorrhea and sore throat  Negative for ear pain, facial swelling, sinus pressure, trouble swallowing and voice change  Eyes: Negative for discharge, itching and visual disturbance  Respiratory: Positive for cough  Negative for chest tightness, shortness of breath and wheezing  Cardiovascular: Negative for chest pain  Gastrointestinal: Negative for abdominal pain, diarrhea, nausea and vomiting  Genitourinary: Negative for decreased urine volume  Musculoskeletal: Negative for back pain, myalgias and neck pain  Skin: Negative for rash  Neurological: Negative for dizziness, syncope, weakness, numbness and headaches  All other systems reviewed and are negative          Current Medications       Current Outpatient Medications:   •  albuterol (PROVENTIL HFA,VENTOLIN HFA) 90 mcg/act inhaler, Inhale 2 puffs every 6 (six) hours as needed for wheezing or shortness of breath, Disp: 8 g, Rfl: 5  •  benzonatate (TESSALON PERLES) 100 mg capsule, Take 1 capsule (100 mg total) by mouth 3 (three) times a day as needed for cough, Disp: 9 capsule, Rfl: 0  •  prazosin (MINIPRESS) 2 mg capsule, Take 4 mg by mouth daily at bedtime, Disp: , Rfl:   •  QUEtiapine (SEROquel) 100 mg tablet, Take 25 mg by mouth daily at bedtime , Disp: , Rfl:   •  ciprofloxacin-dexamethasone (CIPRODEX) otic suspension, Administer 4 drops into the left ear 2 (two) times a day (Patient not taking: Reported on 3/3/2023), Disp: 7 5 mL, Rfl: 0  •  citalopram (CeleXA) 20 mg tablet, Take 20 mg by mouth daily (Patient not taking: Reported on 3/3/2023), Disp: , Rfl:   •  DULoxetine (CYMBALTA) 20 mg capsule, Take 20 mg by mouth daily, Disp: , Rfl:   •  methocarbamol (ROBAXIN) 500 mg tablet, Take 1 tablet (500 mg total) by mouth 3 (three) times a day as needed for muscle spasms (Patient not taking: Reported on 2023), Disp: 10 tablet, Rfl: 0  •  methylPREDNISolone 4 MG tablet therapy pack, Use as directed on package (Patient not taking: Reported on 2023), Disp: 21 tablet, Rfl: 0  •  Trintellix 5 MG tablet, , Disp: , Rfl:     Current Allergies     Allergies as of 2023 - Reviewed 2023   Allergen Reaction Noted   • Lamictal [lamotrigine] Fever 2023   • Amoxicillin Hives 2016   • Penicillins Hives 2016   • Vistaril [hydroxyzine] Hives 2021            The following portions of the patient's history were reviewed and updated as appropriate: allergies, current medications, past family history, past medical history, past social history, past surgical history and problem list      Past Medical History:   Diagnosis Date   • Asthma        Past Surgical History:   Procedure Laterality Date   •  SECTION     • TONSILLECTOMY     • WISDOM TOOTH EXTRACTION         No family history on file  Medications have been verified  Objective   /64   Pulse 88   Temp 99 1 °F (37 3 °C)   Resp 18   Ht 5' (1 524 m)   Wt 102 kg (225 lb)   SpO2 99%   BMI 43 94 kg/m²        Physical Exam     Physical Exam  Vitals and nursing note reviewed  Constitutional:       General: She is not in acute distress  Appearance: Normal appearance  She is not ill-appearing or toxic-appearing  HENT:      Head: Normocephalic and atraumatic  Right Ear: Tympanic membrane normal       Left Ear: Tympanic membrane normal       Mouth/Throat:      Mouth: Mucous membranes are moist       Pharynx: No oropharyngeal exudate or posterior oropharyngeal erythema  Eyes:      Pupils: Pupils are equal, round, and reactive to light  Cardiovascular:      Rate and Rhythm: Normal rate and regular rhythm  Heart sounds: Normal heart sounds  Pulmonary:      Effort: Pulmonary effort is normal       Breath sounds: Normal breath sounds     Skin:     Capillary Refill: Capillary refill takes less than 2 seconds  Neurological:      Mental Status: She is alert and oriented to person, place, and time     Psychiatric:         Mood and Affect: Mood normal          Behavior: Behavior normal

## 2023-04-03 LAB
FLUAV RNA RESP QL NAA+PROBE: NEGATIVE
FLUBV RNA RESP QL NAA+PROBE: NEGATIVE
SARS-COV-2 RNA RESP QL NAA+PROBE: NEGATIVE

## 2023-04-05 LAB — BACTERIA THROAT CULT: NORMAL

## 2023-05-30 ENCOUNTER — TELEPHONE (OUTPATIENT)
Dept: BEHAVIORAL/MENTAL HEALTH CLINIC | Facility: CLINIC | Age: 38
End: 2023-05-30

## 2023-05-30 ENCOUNTER — OFFICE VISIT (OUTPATIENT)
Dept: BEHAVIORAL/MENTAL HEALTH CLINIC | Facility: CLINIC | Age: 38
End: 2023-05-30

## 2023-05-30 ENCOUNTER — DOCUMENTATION (OUTPATIENT)
Dept: BEHAVIORAL/MENTAL HEALTH CLINIC | Facility: CLINIC | Age: 38
End: 2023-05-30

## 2023-05-30 ENCOUNTER — OFFICE VISIT (OUTPATIENT)
Dept: PSYCHIATRY | Facility: CLINIC | Age: 38
End: 2023-05-30

## 2023-05-30 DIAGNOSIS — F33.1 MODERATE EPISODE OF RECURRENT MAJOR DEPRESSIVE DISORDER (HCC): Primary | ICD-10-CM

## 2023-05-30 DIAGNOSIS — F31.32 BIPOLAR 1 DISORDER, DEPRESSED, MODERATE (HCC): Primary | ICD-10-CM

## 2023-05-30 RX ORDER — PRAZOSIN HYDROCHLORIDE 2 MG/1
2 CAPSULE ORAL
Qty: 30 CAPSULE | Refills: 1 | Status: SHIPPED | OUTPATIENT
Start: 2023-05-30

## 2023-05-30 NOTE — PATIENT INSTRUCTIONS
Discharge and Safety    This writer discussed the patients current presentation and recommended discharge plan with Dr Tineo  They agree with the patient being discharged at this time with appointment for medication management and therapy  The patient was [facility] with [facility] for [facility]30/2023 @ 1:00     This writer and the patient completed a safety plan  The patient was provided with a copy of their safety plan with encouragement to utilize the plan following discharge  In addition, the patient was instructed to call local [facility] crisis, other crisis services, [facility] or to go to the nearest ER immediately if their situation changes at any time  This writer discussed discharge plans with the patient and family-  who agrees with and understands the discharge plans       SAFETY PLAN  Warning Signs (thoughts, images, mood, behavior, situations) of a potential crisis: increased anxiety , mood instability      Coping Skills (what can I do to take my mind off the problem, or to keep myself safe): go for a walk, reach out to peer support      Outside Support (who can I reach out to for support and help): mother and drug and alcohol counselor         National Suicide Prevention Hotline:  9-913.724.3368    [facility] [address] [facility] 310: [facility]: [address] [facility] [address] 620 [facility] 611 [facility] [phone] - Crisis   [phone] - Peer Support Talk Line (1-9pm daily)  [phone] - Teen Support Talk Line (1-9pm daily)  [address] [address] [facility] [address] [place] [address] [phone] - 269[address] [facility]

## 2023-05-30 NOTE — TELEPHONE ENCOUNTER
Telephone call to PT  PT is scheduled for 1:00pm today with Dr Flora Chandra DO for psychiatry  PT confirmed attendance for this appointment  PT will contact [facility] to ensure services are discontinued as PT is establishing care through [facility] [address]
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
## 2023-05-31 NOTE — PSYCH
" [address]    Name and Date of Birth:  Micah Lieberman 31 y o  1985 MRN: [id number]    Date of Visit: May 31, 2023    Reason for visit: Initial psychiatric intake assessment    Chief complaint: \"My depression is getting worse\"    History of Present Illness (HPI):      Micah Lieberman is a 41 y o , female, possessing a previous psychiatric history significant for bipolar disorder, opioid use disorder, methamphetamine use disorder, medically uncomplicated, presenting for intake assessment  Symptoms prior to presentation include worsening of depressive symptoms such as low mood, hyperphagia, hypersomnia, poor concentration, anhedonia, and low energy  She endorses a history of mood swings where she will become more irritable that occur monthly and lasts several days that are usually related to interpersonal conflict  However, she denies symptoms of mireya described as unprovoked shift to elevated and expansive mood that last several days and is characterized by decreased need for sleep, increased impulsivity, grandiosity, racing thoughts, and/or pressured speech  She denies a history of auditory or visual hallucinations  She endorses sobriety from opioids for the past 5 years, and methamphetamines for the past 7 months  Haven Payer states that she has been experiencing symptoms of depression for the past several months since coming home from halfway  She had went to halfway after violating her parole by not meeting with her  at a scheduled time  She endorses at this time that she was using methamphetamine and became sober in assisted  She is now participating in recovery groups and becoming more active in her Anabaptist and becoming closer to her family   However, she is having significant stress related to her 15year old son who has been stealing money from the family, engaging in patel crimes, and is now set to spend the next 3 " months at a Juvenile Delinquent facility  She believes it is for the best for him but is having a hard time accepting that he will be leaving the home  Since then, she has had difficulty completing tasks such as doing the dishes and other house work, sleeps 1-2 hours more per day than usual, isn't as interested or engaged in activity related to her Druze or recovery groups, and is eating more than usual despite recognizing that her weight has become an issue  She notes that she received benefit from the vortioxetine 5mg before going to assisted, but that she was unable to get that prescribed in assisted and was switched to cymbalta for an antidepressant medication  While [facility], she has had difficulty following up with the clinic and has had several cancellations in the past  Takes cymbalta 20mg QAM, minipress 2mg QHS, and seroquel 25mg QHS  Current Rating Scores:     None completed today      Psychiatric Review Of Systems:    Appetite: increased  Adverse eating: yes  Weight changes: increased  Insomnia/sleeplessness: no  Fatigue/anergy: yes  Anhedonia/lack of interest: yes  Attention/concentration: decreased  Psychomotor agitation/retardation: no  Somatic symptoms: no  Anxiety/panic attack: no  Kristi/hypomania: no  Hopelessness/helplessness/worthlessness: no  Self-injurious behavior/high-risk behavior: no  Suicidal ideation: no  Homicidal ideation: no  Auditory hallucinations: no  Visual hallucinations: no  Other perceptual disturbances: no  Delusional thinking: no  Obsessive/compulsive symptoms: no    Review Of Systems:    Constitutional negative   ENT negative   Cardiovascular negative   Respiratory negative   Gastrointestinal negative   Genitourinary negative   Musculoskeletal negative   Integumentary negative   Neurological negative   Endocrine negative   Other Symptoms none, all other systems are negative       Family Psychiatric History:     No family history on file     Unknown      Past Psychiatric History:     Previous inpatient psychiatric admissions: Denies  Previous inpatient/outpatient substance abuse rehabilitation: Yes, 3x in the past   Present/previous outpatient psychiatric treatment: yes  Present/previous psychotherapy: Not recently  History of suicidal attempts/gestures: once at age 13  History of violence/aggressive behaviors: no   Present/previous psychotropic medication use: Cymbalta, seroquel, paxil, zoloft, prozac, naltrexone, minipress  Substance Abuse History:    Opioid use disorder  Methamphetamine use disorder    Carlos Crum does not apear under the influence or withdrawal of any psychoactive substance throughout today's examination  Social History:    Academic history: high school diploma/GED  Marital history:   Children: 2  Social support system: mother and extended family  Residential history: Resides in home; lives with mother, son, daughter  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  Access to firearms: denies direct access to weapons/firearms  Legal History: yes, 2017 was sentenced to detention for     Traumatic History:     Abuse:none is reported   Other Traumatic Events: defer    Past Medical History:    Past Medical History:   Diagnosis Date   • Asthma         Past Surgical History:   Procedure Laterality Date   •  SECTION     • TONSILLECTOMY     • WISDOM TOOTH EXTRACTION       Allergies   Allergen Reactions   • Lamictal [Lamotrigine] Fever   • Amoxicillin Hives   • Penicillins Hives   • Vistaril [Hydroxyzine] Hives       History Review: The following portions of the patient's history were reviewed and updated as appropriate: allergies, current medications, past family history, past medical history, past social history, past surgical history and problem list     OBJECTIVE:    Vital signs in last 24 hours: There were no vitals filed for this visit      Mental Status Evaluation:    Appearance age appropriate, "casually dressed   Behavior cooperative, calm   Speech hypertalkative   Mood depressed, anxious   Affect overbright   Thought Processes organized, goal directed   Associations intact associations   Thought Content no overt delusions   Perceptual Disturbances: no auditory hallucinations, no visual hallucinations   Abnormal Thoughts  Risk Potential Suicidal ideation - None at present  Homicidal ideation - None at present  Potential for aggression - No   Orientation oriented to person, place, time/date and situation   Memory recent and remote memory grossly intact   Consciousness alert and awake   Attention Span Concentration Span attention span and concentration are age appropriate   Intellect appears to be of average intelligence   Insight intact   Judgement intact   Muscle Strength and  Gait normal muscle strength and normal muscle tone, normal gait and normal balance   Motor Activity no abnormal movements   Language no difficulty naming common objects, no difficulty repeating a phrase, no difficulty writing a sentence   Fund of Knowledge adequate knowledge of current events  adequate fund of knowledge regarding past history  adequate fund of knowledge regarding vocabulary    Pain none   Pain Scale 0       Laboratory Results: I have personally reviewed all pertinent laboratory/tests results    Labs in last 72 hours: No results for input(s): \"ALKPHOS\", \"ALT\", \"AMMONIA\", \"AST\", \"BILITOT\", \"BUN\", \"CALCIUM\", \"CARBAMAZEPIN\", \"CHOL\", \"CL\", \"CO2\", \"CREATININE\", \"FREET4\", \"GLUCOSE\", \"HCG\", \"HCGQUANT\", \"HCT\", \"HDL\", \"HGB\", \"K\", \"LDLCALC\", \"LITHIUM\", \"NA\", \"NEUTROABS\", \"PLT\", \"PREGSERUM\", \"PREGTESTUR\", \"PROT\", \"RBC\", \"RDW\", \"RPR\", \"T3FREE\", \"TRIG\", \"[id number]\", \"VALPROICTOT\", \"WBC\" in the last 72 hours      Invalid input(s): \"ALBUMIN\"    Suicide/Homicide Risk Assessment:    Risk of Harm to Self:  The following ratings are based on assessment at the time of the interview  Demographic risk factors include: , "   Historical Risk Factors include: chronic psychiatric problems, chronic mood disorder, history of suicide attempt, history of substance use, history of impulsive behaviors  Recent Specific Risk Factors include: diagnosis of mood disorder  Protective Factors: no current suicidal ideation, ability to adapt to change, resiliency, restricted access to lethal means, stable living environment, sense of determination, sense of importance of health and wellness, sense of personal control, sobriety, strong relationships, supportive family, supportive friends  Weapons: none  The following steps have been taken to ensure weapons are properly secured: not applicable  Based on today's assessment, Audie Rinne presents the following risk of harm to self: none    Risk of Harm to Others: The following ratings are based on assessment at the time of the interview  Demographic Risk Factors include: none  Historical Risk Factors include: drug abuse, alcohol abuse, prior arrest, history of substance use  Recent Specific Risk Factors include: social difficulties, risk taking  Protective Factors: no current homicidal ideation, ability to adapt to change, being a parent, compliant with medications, connection to own children, contact with caregivers, sobriety  Weapons: none  The following steps have been taken to ensure weapons are properly secured: not applicable  Based on today's assessment, Audie Rinne presents the following risk of harm to others: none    The following interventions are recommended: no intervention changes needed  Although patient's acute lethality risk is low, long-term/chronic lethality risk is mildly elevated in the presence of major depressive disorder  At the current moment, Audie Rinne is future-oriented, forward-thinking, and demonstrates ability to act in a self-preserving manner as evidenced by volitionally presenting to the clinic today, seeking treatment  To mitigate future risk, patient should adhere to the recommendations of this writer, avoid alcohol/illicit substance use, utilize community-based resources and familiar support and prioritize mental health treatment  Presently, patient denies suicidal/homicidal ideation in addition to thoughts of self-injury; contracts for safety, see below for risk assessment  At conclusion of evaluation, patient is amenable to the recommendations of this writer including: mediation management  Also, patient is amenable to calling/contacting the outpatient office including this writer if any acute adverse effects of their medication regimen arise in addition to any comments or concerns pertaining to their psychiatric management  Patient is amenable to calling/contacting crisis and/or attending to the nearest emergency department if their clinical condition deteriorates to assure their safety and stability, stating that they are able to appropriately confide in their family, psychiatrist regarding their psychiatric state  Assessment/Plan:     40 y/o female with PPHx of polysubstance use, sober, presenting with symptoms of moderate major depressive disorder in context of social stressors  Questionable history of bipolar disorder, seems unlikely given montly frequency and characterization of symptoms  Consistently without flight of ideas and decreased need for sleep  No history of psychosis symptoms without substance abuse  Denies suicidal or homicidal ideation  Will treat depression given non-response to cymbalta with trintellix  DSM-5 Diagnoses:     • Major Depressive Disorder, moderate, without psychotic features      Treatment Recommendations/Precautions:    • Cease cymbalta  • Cease seroquel     • Initiate trintellix 10mg QAM   • Continue minipress 2mg QHS  • Aware of 24 hour and weekend coverage for urgent situations accessed by calling [facility] main practice number    Medications Risks/Benefits:      Risks, Benefits And Possible Side Effects Of Medications:    Risks, benefits, and possible side effects of medications explained to Emili and she verbalizes understanding and agreement for treatment       Controlled Medication Discussion:     Not applicable    Treatment Plan:    Completed and signed during the session: Yes - with Omar Joshi    This note was not shared with the patient due to reasonable likelihood of causing patient harm    Visit Time    Total Visit Duration: 60 minutes    Morris Bonilla DO 05/31/23

## 2023-05-31 NOTE — PSYCH
" [address]    Name and Date of Birth:  Dash Spence 6 y o  1985 MRN: [id number]    Date of Visit: May 30, 2023    Reason for visit: Initial psychiatric intake assessment    Chief complaint: \"My depression is getting worse\"    History of Present Illness (HPI):      Dash Spence is a 41 y o , female, possessing a previous psychiatric history significant for bipolar disorder, opioid use disorder, methamphetamine use disorder, medically uncomplicated, presenting for intake assessment  Symptoms prior to presentation include worsening of depressive symptoms such as low mood, hyperphagia, hypersomnia, poor concentration, anhedonia, and low energy  She endorses a history of mood swings where she will become more irritable that occur monthly and lasts several days that are usually related to interpersonal conflict  However, she denies symptoms of mireya described as unprovoked shift to elevated and expansive mood that last several days and is characterized by decreased need for sleep, increased impulsivity, grandiosity, racing thoughts, and/or pressured speech  She denies a history of auditory or visual hallucinations  She endorses sobriety from opioids for the past 5 years, and methamphetamines for the past 7 months  Dottie Jordan states that she has been experiencing symptoms of depression for the past several months since coming home from [facility]  She had went to [facility] after violating her parole by not meeting with her  at a scheduled time  She endorses at this time that she was using methamphetamine and became sober in skilled nursing  She is now participating in recovery groups and becoming more active in her [employer] and becoming closer to her family   However, she is having significant stress related to her 15year old son who has been stealing money from the family, engaging in patel crimes, and is now set to spend the next 3 " months at a [facility]venile [facility]inquent facility  She believes it is for the best for him but is having a hard time accepting that he will be leaving the home  Since then, she has had difficulty completing tasks such as doing the dishes and other house work, sleeps 1-2 hours more per day than usual, isn't as interested or engaged in activity related to her [employer] or recovery groups, and is eating more than usual despite recognizing that her weight has become an issue  She notes that she received benefit from the vortioxetine 5mg before going to [facility], but that she was unable to get that prescribed in [facility] and was switched to cymbalta for an antidepressant medication  While [facility] has [facility], she has had difficulty following up with the clinic and has had several cancellations in the past  Takes cymbalta 20mg QAM, minipress 2mg QHS, and seroquel 25mg QHS  Current Rating Scores:     None completed today      Psychiatric Review Of Systems:    Appetite: increased  Adverse eating: yes  Weight changes: increased  Insomnia/sleeplessness: no  Fatigue/anergy: yes  Anhedonia/lack of interest: yes  Attention/concentration: decreased  Psychomotor agitation/retardation: no  Somatic symptoms: no  Anxiety/panic attack: no  Kristi/hypomania: no  Hopelessness/helplessness/worthlessness: no  Self-injurious behavior/high-risk behavior: no  Suicidal ideation: no  Homicidal ideation: no  Auditory hallucinations: no  Visual hallucinations: no  Other perceptual disturbances: no  Delusional thinking: no  Obsessive/compulsive symptoms: no    Review Of Systems:    Constitutional negative   ENT negative   Cardiovascular negative   Respiratory negative   Gastrointestinal negative   Genitourinary negative   Musculoskeletal negative   Integumentary negative   Neurological negative   Endocrine negative   Other Symptoms none, all other systems are negative       Family Psychiatric History:     No family history on file     Unknown      Past Psychiatric History:     Previous inpatient psychiatric admissions: Denies  Previous inpatient/outpatient substance abuse rehabilitation: Yes, 3x in the past   Present/previous outpatient psychiatric treatment: yes  Present/previous psychotherapy: Not recently  History of suicidal attempts/gestures: once at age 13  History of violence/aggressive behaviors: no   Present/previous psychotropic medication use: Cymbalta, seroquel, paxil, zoloft, prozac, naltrexone, minipress  Substance Abuse History:    Opioid use disorder  Methamphetamine use disorder    Fly Quiñonez does not apear under the influence or withdrawal of any psychoactive substance throughout today's examination  Social History:    Academic history: high school diploma/GED  Marital history:   Children: 2  Social support system: mother and extended family  Residential history: Resides in home; lives with mother, son, daughter  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  Access to firearms: denies direct access to weapons/firearms  Legal History: yes, 2017 was sentenced to prison for     Traumatic History:     Abuse:none is reported   Other Traumatic Events: defer    Past Medical History:    Past Medical History:   Diagnosis Date   • Asthma         Past Surgical History:   Procedure Laterality Date   •  SECTION     • TONSILLECTOMY     • WISDOM TOOTH EXTRACTION       Allergies   Allergen Reactions   • Lamictal [Lamotrigine] Fever   • Amoxicillin Hives   • Penicillins Hives   • Vistaril [Hydroxyzine] Hives       History Review: The following portions of the patient's history were reviewed and updated as appropriate: allergies, current medications, past family history, past medical history, past social history, past surgical history and problem list     OBJECTIVE:    Vital signs in last 24 hours: There were no vitals filed for this visit      Mental Status Evaluation:    Appearance age appropriate, "casually dressed   Behavior cooperative, calm   Speech hypertalkative   Mood depressed, anxious   Affect overbright   Thought Processes organized, goal directed   Associations intact associations   Thought Content no overt delusions   Perceptual Disturbances: no auditory hallucinations, no visual hallucinations   Abnormal Thoughts  Risk Potential Suicidal ideation - None at present  Homicidal ideation - None at present  Potential for aggression - No   Orientation oriented to person, place, time/date and situation   Memory recent and remote memory grossly intact   Consciousness alert and awake   Attention Span Concentration Span attention span and concentration are age appropriate   Intellect appears to be of average intelligence   Insight intact   Judgement intact   Muscle Strength and  Gait normal muscle strength and normal muscle tone, normal gait and normal balance   Motor Activity no abnormal movements   Language no difficulty naming common objects, no difficulty repeating a phrase, no difficulty writing a sentence   Fund of Knowledge adequate knowledge of current events  adequate fund of knowledge regarding past history  adequate fund of knowledge regarding vocabulary    Pain none   Pain Scale 0       Laboratory Results: I have personally reviewed all pertinent laboratory/tests results    Labs in last 72 hours: No results for input(s): \"ALKPHOS\", \"ALT\", \"AMMONIA\", \"AST\", \"BILITOT\", \"BUN\", \"CALCIUM\", \"CARBAMAZEPIN\", \"CHOL\", \"CL\", \"CO2\", \"CREATININE\", \"FREET4\", \"GLUCOSE\", \"HCG\", \"HCGQUANT\", \"HCT\", \"HDL\", \"HGB\", \"K\", \"LDLCALC\", \"LITHIUM\", \"NA\", \"NEUTROABS\", \"PLT\", \"PREGSERUM\", \"PREGTESTUR\", \"PROT\", \"RBC\", \"RDW\", \"RPR\", \"T3FREE\", \"TRIG\", \"[id number]\", \"VALPROICTOT\", \"WBC\" in the last 72 hours      Invalid input(s): \"ALBUMIN\"    Suicide/Homicide Risk Assessment:    Risk of Harm to Self:  The following ratings are based on assessment at the time of the interview  Demographic risk factors include: , "   Historical Risk Factors include: chronic psychiatric problems, chronic mood disorder, history of suicide attempt, history of substance use, history of impulsive behaviors  Recent Specific Risk Factors include: diagnosis of mood disorder  Protective Factors: no current suicidal ideation, ability to adapt to change, resiliency, restricted access to lethal means, stable living environment, sense of determination, sense of importance of health and wellness, sense of personal control, sobriety, strong relationships, supportive family, supportive friends  Weapons: none  The following steps have been taken to ensure weapons are properly secured: not applicable  Based on today's assessment, Duke Barber presents the following risk of harm to self: none    Risk of Harm to Others: The following ratings are based on assessment at the time of the interview  Demographic Risk Factors include: none  Historical Risk Factors include: drug abuse, alcohol abuse, prior arrest, history of substance use  Recent Specific Risk Factors include: social difficulties, risk taking  Protective Factors: no current homicidal ideation, ability to adapt to change, being a parent, compliant with medications, connection to own children, contact with caregivers, sobriety  Weapons: none  The following steps have been taken to ensure weapons are properly secured: not applicable  Based on today's assessment, Duke Barber presents the following risk of harm to others: none    The following interventions are recommended: no intervention changes needed  Although patient's acute lethality risk is low, long-term/chronic lethality risk is mildly elevated in the presence of major depressive disorder  At the current moment, Duke Barber is future-oriented, forward-thinking, and demonstrates ability to act in a self-preserving manner as evidenced by volitionally presenting to the clinic today, seeking treatment  To mitigate future risk, patient should adhere to the recommendations of this writer, avoid alcohol/illicit substance use, utilize community-based resources and familiar support and prioritize mental health treatment  Presently, patient denies suicidal/homicidal ideation in addition to thoughts of self-injury; contracts for safety, see below for risk assessment  At conclusion of evaluation, patient is amenable to the recommendations of this writer including: mediation management  Also, patient is amenable to calling/contacting the outpatient office including this writer if any acute adverse effects of their medication regimen arise in addition to any comments or concerns pertaining to their psychiatric management  Patient is amenable to calling/contacting crisis and/or attending to the nearest emergency department if their clinical condition deteriorates to assure their safety and stability, stating that they are able to appropriately confide in their family, psychiatrist regarding their psychiatric state  Assessment/Plan:     40 y/o female with PPHx of polysubstance use, sober, presenting with symptoms of moderate major depressive disorder in context of social stressors  Questionable history of bipolar disorder, seems unlikely given montly frequency and characterization of symptoms  Consistently without flight of ideas and decreased need for sleep  No history of psychosis symptoms without substance abuse  Denies suicidal or homicidal ideation  Will treat depression given non-response to cymbalta with trintellix  DSM-5 Diagnoses:     • Major Depressive Disorder, moderate, without psychotic features      Treatment Recommendations/Precautions:    • Cease cymbalta  • Cease seroquel     • Initiate trintellix 10mg QAM   • Continue minipress 2mg QHS  • Aware of 24 hour and weekend coverage for urgent situations accessed by calling [facility] main practice number    Medications Risks/Benefits:      Risks, Benefits And Possible Side Effects Of Medications:    Risks, benefits, and possible side effects of medications explained to Emili and she verbalizes understanding and agreement for treatment       Controlled Medication Discussion:     Not applicable    Treatment Plan:    Completed and signed during the session: Yes - with [facility]    This note was not shared with the patient due to reasonable likelihood of causing patient harm    Visit Time    Total Visit Duration: 60 minutes    This note was not shared with the patient due to reasonable likelihood of causing patient harm    Maine Rubi MD 05/30/23

## 2023-06-02 ENCOUNTER — TELEPHONE (OUTPATIENT)
Dept: BEHAVIORAL/MENTAL HEALTH CLINIC | Facility: CLINIC | Age: 38
End: 2023-06-02

## 2023-06-07 ENCOUNTER — VBI (OUTPATIENT)
Dept: ADMINISTRATIVE | Facility: OTHER | Age: 38
End: 2023-06-07

## 2023-06-07 ENCOUNTER — TELEPHONE (OUTPATIENT)
Dept: FAMILY MEDICINE CLINIC | Facility: CLINIC | Age: 38
End: 2023-06-07

## 2023-06-07 DIAGNOSIS — Z11.1 SCREENING-PULMONARY TB: Primary | ICD-10-CM

## 2023-06-07 NOTE — TELEPHONE ENCOUNTER
Patients would like Chest  xray ordered for her to rule out TB, she is allergic to the injection, any questions  Or once chest xray is ordered please call patient at [phone]

## 2023-06-08 ENCOUNTER — TELEPHONE (OUTPATIENT)
Dept: PSYCHIATRY | Facility: CLINIC | Age: 38
End: 2023-06-08

## 2023-06-08 ENCOUNTER — TELEPHONE (OUTPATIENT)
Dept: BEHAVIORAL/MENTAL HEALTH CLINIC | Facility: CLINIC | Age: 38
End: 2023-06-08

## 2023-06-08 NOTE — TELEPHONE ENCOUNTER
"Telephone call to PT following up from [facility] visit  PT reports feeling \"alright  \" PT reports \"I sent my son to placement and I need my medication  \" PT reports that she is waiting on a prior authorization for the medication \"Trintellix\" (Dr Antonio Landry notified)  PT is scheduled for therapy on 6/12/23 at 3pm with [facility], [facility]  PT is scheduled for psychiatry with Dr Antonio Landry DO on 6/28/23  PT confirmed attendance for both appointments  PT thanked CM for following up and denies needing anything further at this time       "

## 2023-06-08 NOTE — TELEPHONE ENCOUNTER
Patient called [address] with concerns Trintellix requires prior authorization  I called pharmacy and confirmed that this is true  Could you please work on prior authorization and fax over any necessary information? Past medication trials and failures listed on intake note from resident physician  Thank you!

## 2023-06-09 NOTE — TELEPHONE ENCOUNTER
Fax from [employer] with Prior Authorization approval for Trintellix 10 MG tablet effective 6/8/23 - 6/8/24  Called pharmacy and updated them with approval information  Per pharmacist, medication was picked up yesterday

## 2023-06-09 NOTE — TELEPHONE ENCOUNTER
Patient last seen 5/2021  Needs appointment  Called and LM with pt requesting a call back to schedule

## 2023-06-13 NOTE — TELEPHONE ENCOUNTER
Is this screening for TB or concern about exposure/does she have symptoms? If screening [employer] would be more appropriate- thanks!

## 2023-06-28 ENCOUNTER — OFFICE VISIT (OUTPATIENT)
Dept: PSYCHIATRY | Facility: CLINIC | Age: 38
End: 2023-06-28
Payer: COMMERCIAL

## 2023-06-28 DIAGNOSIS — F43.20 ANTICIPATORY GRIEF: ICD-10-CM

## 2023-06-28 DIAGNOSIS — F15.21 METHAMPHETAMINE USE DISORDER, MODERATE, IN SUSTAINED REMISSION (HCC): ICD-10-CM

## 2023-06-28 DIAGNOSIS — F33.41 RECURRENT MAJOR DEPRESSIVE DISORDER, IN PARTIAL REMISSION (HCC): Primary | ICD-10-CM

## 2023-06-28 PROCEDURE — 99213 OFFICE O/P EST LOW 20 MIN: CPT | Performed by: STUDENT IN AN ORGANIZED HEALTH CARE EDUCATION/TRAINING PROGRAM

## 2023-06-28 NOTE — PSYCH
"MEDICATION MANAGEMENT NOTE        [facility]      Name and Date of Birth:  Beryle Lawn 89 y o  1985 MRN: [id number]    Date of Visit: 2023    Visit Time    Visit Start Time: 1500  Visit Stop Time:   Total Visit Duration: 30 minutes      Reason for Visit: Follow-up visit regarding medication management     Chief Complaint: \"I feel okay, just had some stress recently  \"    SUBJECTIVE:    Beryle Lawn is a 41 y o , female, possessing a past psychiatric history significant for depression and substance abuse, who was personally seen and evaluated at the [address] [place] [address] E outpatient clinic for follow-up regarding medication management  She is currently prescribed Trintellix 10 mg daily and prazosin 2 mg nightly  During interview today, Vidhya Prado states that since their previous outpatient psychiatric appointment, she has been doing \"pretty good\"  She states that the Trintellix has helped her depression, and she has been sleeping well with the Minipress  She states that she feels an improvement in her mood, has been feeling happier and more motivated  She states that she has had some stressors recently  She states that her boyfriend's cousin is currently in the ICU and unresponsive on life support  She states that the cousin had overdosed on illicit substances after being sober for 3 years  Vidhya Lion begins crying, stating that she feels bad for the person's daughter who will have to make the decision to remove life support  Vidhya Lion states that she also feels angry at the cousin  She concedes that she sometimes feels like \"people closer and closer to me are dying from drug overdoses \"  She admits she has not gone to a  for friends who she has lost in the past, and she states that she once went to a  in high school and several peers were high while they were there    Vidhya Lion states that she also remembers that \"you " "could throw everything away, just like that \"  She states that she has been more interested in planning on 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  She states that she thinks this would be good for her, and she admits that she just graduated from drug and alcohol after completing the program   She states that she had trouble completing the program for the past 12 years, often relapsing  However, she states that she is proud of herself for completing it and should soon be off parole  She states she does miss her son, who is currently in a program for juvenile delinquent's in [place] [address]  She states she was able to visit him, and he seems to be doing fairly well  Zenia Snow states that she is trying to improve herself so that \"when he gets discharged and comes home, things will be a lot more stable \"  She denies thoughts to herself or anyone else, denies any hallucinations  She states that she is eating well, and has started exercising more  She states she is planning on going to a weekly line dancing group in the area  Current Rating Scores:   None completed today      Psychiatric Review Of Systems:    Appetite: no change  Adverse eating: no  Weight changes: no  Insomnia/sleeplessness: no  Fatigue/anergy: no  Anhedonia/lack of interest: no  Attention/concentration: no change  Psychomotor agitation/retardation: no  Somatic symptoms: no  Anxiety/panic attack: worrying  Kristi/hypomania: no  Hopelessness/helplessness/worthlessness: no  Self-injurious behavior/high-risk behavior: no  Suicidal ideation: no  Homicidal ideation: no  Auditory hallucinations: no  Visual hallucinations: no  Other perceptual disturbances: no  Delusional thinking: no  Obsessive/compulsive symptoms: no    Review Of Systems:      Constitutional negative   ENT negative   Cardiovascular negative   Respiratory negative   Gastrointestinal negative   Genitourinary negative   Musculoskeletal negative   Integumentary " negative   Neurological negative   Endocrine negative   Other Symptoms none, all other systems are negative     History Review: The following portions of the patient's history were reviewed and updated as appropriate: allergies, current medications, past family history, past medical history, past social history, past surgical history and problem list          OBJECTIVE:     Vital signs in last 24 hours: There were no vitals filed for this visit  Mental Status Evaluation:    Appearance age appropriate, casually dressed   Behavior cooperative, calm   Speech normal rate, normal volume, normal pitch   Mood less anxious, less dysphoric   Affect normal range and intensity, appropriate   Thought Processes organized, goal directed   Associations intact associations   Thought Content no overt delusions   Perceptual Disturbances: no auditory hallucinations, no visual hallucinations   Abnormal Thoughts  Risk Potential Suicidal ideation - None  Homicidal ideation - None  Potential for aggression - No   Orientation oriented to person, place, time/date and situation   Memory recent and remote memory grossly intact   Consciousness alert and awake   Attention Span Concentration Span attention span and concentration are age appropriate   Intellect appears to be of average intelligence   Insight intact   Judgement intact   Muscle Strength and  Gait normal muscle strength and normal muscle tone, normal gait and normal balance   Motor activity no abnormal movements   Fund of Knowledge adequate knowledge of current events  adequate fund of knowledge regarding past history  adequate fund of knowledge regarding vocabulary    Pain none   Pain Scale 0       Laboratory Results: I have personally reviewed all pertinent laboratory/tests results    Recent Labs (last 2 months):   No visits with results within 2 Month(s) from this visit     Latest known visit with results is:   Office Visit on 04/02/2023   Component Date Value   •  RAPID STREP A 04/02/2023 Negative    • Throat Culture 04/02/2023 Negative for beta-hemolytic Streptococcus    • SARS-CoV-2 04/02/2023 Negative    • INFLUENZA A PCR 04/02/2023 Negative    • INFLUENZA B PCR 04/02/2023 Negative        Suicide/Homicide Risk Assessment:    The following interventions are recommended: contracts for safety at present - agrees to go to ED if feeling unsafe, contracts for safety at present - agrees to call Crisis Intervention Service if feeling unsafe  Although patient's acute lethality risk is low, long-term/chronic lethality risk is mildly elevated in the presence of depression  At the current moment, Nehemiah Sharma is future-oriented, forward-thinking, and demonstrates ability to act in a self-preserving manner as evidenced by volitionally presenting to the clinic today, seeking treatment  To mitigate future risk, patient should adhere to the recommendations below, avoid alcohol/illicit substance use, utilize community-based resources and familiar support and prioritize mental health treatment  Presently, patient denies active suicidal/homicidal ideation in addition to thoughts of self-injury; contracts for safety  At conclusion of evaluation, patient is amenable to the recommendations below  Patient is amenable to calling/contacting the outpatient office including this writer if any acute adverse effects of their medication regimen arise in addition to any comments or concerns pertaining to their psychiatric management  Patient is amenable to calling/contacting crisis and/or attending to the nearest emergency department if their clinical condition deteriorates to assure their safety and stability, stating that they are able to appropriately confide in their boyfriend regarding their psychiatric state      Assessment/Plan:     Diagnoses and all orders for this visit:    Recurrent major depressive disorder, in partial remission ([facility] [address] )    Methamphetamine use disorder, moderate, [facility]    Anticipatory grief    Domitila Jain is a 60-year-old female who has a history of depression, as well as a history of methamphetamine abuse and remote history of opioid abuse  She is struggling with some anticipatory grief regarding the recent overdose of her friend  This seems to have reminded Domitila Jain of the many friends she has lost over the years due to substance abuse  She seems to be positive and future and goal oriented, does not appear to be in acute danger to herself or others  Overall, she seems to be doing well  Treatment Recommendations/Precautions:    • Change in medications, continue with Trintellix 10 mg daily for treatment of her depression  Continue with Minipress 2 mg nightly for treatment of her anxiety and difficulty with sleep  • Aware of 24 hour and weekend coverage for urgent situations accessed by calling [facility] [address] main practice number  Patient advised to call 911 if feeling suicidal or homicidal before acting out on their thoughts and they expressed understanding  Medications Risks/Benefits      Risks, Benefits And Possible Side Effects Of Medications:    Risks, benefits, and possible side effects of medications explained to Domitila Jain and she verbalizes understanding and agreement for treatment  including: Risks and potential side effects of medication discussed with patient including serotonin syndrome, SIADH, worsening depression, suicidality, induction of mireya, GI upset, headaches, activation, sexual side effects, sedation, potential drug interactions, and others  Patient expressed understanding        Controlled Medication Discussion:     Not applicable    Psychotherapy Provided:     Individual psychotherapy provided: Yes  Counseling was provided during the session today for 16 minutes  Medication education provided to Domitila Jain    Recent stressors discussed with Emili including occasional anxiety, chronic mental illness and recent overdose of her friend  Discussed with Emili coping with missing son while he is in treatment  Coping strategies including cognitive restructuring, compliance with medications, deep/slow breathing, exercising and organizing tasks at home reviewed with Emili  Cognitive therapy was utilized during the session       Treatment Plan:    Completed and signed during the session: Not applicable - Treatment Plan not due at this session    This note was not shared with the patient due to this is a psychotherapy note      Haylie Farrell DO 06/28/23

## 2023-06-29 ENCOUNTER — DOCUMENTATION (OUTPATIENT)
Dept: BEHAVIORAL/MENTAL HEALTH CLINIC | Facility: CLINIC | Age: 38
End: 2023-06-29

## 2023-07-17 ENCOUNTER — OFFICE VISIT (OUTPATIENT)
Dept: FAMILY MEDICINE CLINIC | Facility: CLINIC | Age: 38
End: 2023-07-17
Payer: COMMERCIAL

## 2023-07-17 VITALS
TEMPERATURE: 98.7 F | OXYGEN SATURATION: 98 % | HEART RATE: 80 BPM | HEIGHT: 60 IN | BODY MASS INDEX: 43.39 KG/M2 | SYSTOLIC BLOOD PRESSURE: 116 MMHG | DIASTOLIC BLOOD PRESSURE: 78 MMHG | WEIGHT: 221 LBS

## 2023-07-17 DIAGNOSIS — R73.01 ELEVATED FASTING GLUCOSE: ICD-10-CM

## 2023-07-17 DIAGNOSIS — F19.91 HISTORY OF DRUG USE: Primary | ICD-10-CM

## 2023-07-17 DIAGNOSIS — I10 ESSENTIAL HYPERTENSION: ICD-10-CM

## 2023-07-17 DIAGNOSIS — Z12.4 SCREENING FOR CERVICAL CANCER: ICD-10-CM

## 2023-07-17 DIAGNOSIS — F41.8 DEPRESSION WITH ANXIETY: ICD-10-CM

## 2023-07-17 PROCEDURE — 99214 OFFICE O/P EST MOD 30 MIN: CPT | Performed by: FAMILY MEDICINE

## 2023-07-17 NOTE — PROGRESS NOTES
Assessment/Plan:       Problem List Items Addressed This Visit        Cardiovascular and Mediastinum    Essential hypertension    Relevant Orders    TSH, 3rd generation with Free T4 reflex    Lipid Panel with Direct LDL reflex       Other    History of drug use - Primary    Elevated fasting glucose    Relevant Orders    HEMOGLOBIN A1C W/ EAG ESTIMATION   Other Visit Diagnoses     Depression with anxiety        Relevant Orders    CBC and differential    Comprehensive metabolic panel    Screening for cervical cancer        Relevant Orders    Ambulatory Referral to Obstetrics / Gynecology            Follows with psychiatry and counseling, recommended to reach out to them today and go to [facility] walk-in [address] [place] [address] center in [facility] for support today. Blood work ordered. Close follow-up 1 week. Subjective:      Patient ID: Nya Pimentel is a 41 y.o. female. HPI     Completed drug and alcohol, follows with counseling and , follows with [facility] psychiatrist. Daughter ran away from home, son in juvenile half-way. Follows with psychiatry every month. She has had a lot of concerns about relapse, when things get tough she thinks about using again. 5 years clean heroin, 8 years clean meth. Also taking care of mom. Currently on Trintellix and Minipress. Previously on Suboxone and Vivitrol, stopped this on her own. Struggling with her mental health, feels like medications aren't working as well. Depression takes over, alternating eating too much and eating too little, difficulty sleeping. No SI, feels like failure.      PHQ-2/9 Depression Screening    Little interest or pleasure in doing things: 3 - nearly every day  Feeling down, depressed, or hopeless: 2 - more than half the days  Trouble falling or staying asleep, or sleeping too much: 3 - nearly every day  Feeling tired or having little energy: 3 - nearly every day  Poor appetite or overeating: 3 - nearly every day  Feeling bad about yourself - or that you are a failure or have let yourself or your family down: 1 - several days  Trouble concentrating on things, such as reading the newspaper or watching television: 3 - nearly every day  Moving or speaking so slowly that other people could have noticed. Or the opposite - being so fidgety or restless that you have been moving around a lot more than usual: 1 - several days  Thoughts that you would be better off dead, or of hurting yourself in some way: 0 - not at all  PHQ-2 Score: 5  PHQ-2 Interpretation: POSITIVE depression screen  PHQ-9 Score: 19   PHQ-9 Interpretation: Moderately severe depression          The following portions of the patient's history were reviewed and updated as appropriate: allergies, current medications, past family history, past medical history, past social history, past surgical history, and problem list.    Review of Systems   All other systems reviewed and are negative. Objective:      /78   Pulse 80   Temp 98.7 °F (37.1 °C)   Ht 5' (1.524 m)   Wt 100 kg (221 lb)   SpO2 98%   BMI 43.16 kg/m²          Physical Exam  Vitals reviewed. Constitutional:       General: She is not in acute distress. Appearance: Normal appearance. She is not ill-appearing, toxic-appearing or diaphoretic. Pulmonary:      Effort: Pulmonary effort is normal. No respiratory distress. Neurological:      Mental Status: She is alert and oriented to person, place, and time.    Psychiatric:         Mood and Affect: Mood normal.         Behavior: Behavior normal.             Severo Hewitt, DO [facility]

## 2023-07-29 DIAGNOSIS — F33.1 MODERATE EPISODE OF RECURRENT MAJOR DEPRESSIVE DISORDER (HCC): ICD-10-CM

## 2023-07-31 RX ORDER — VORTIOXETINE 10 MG/1
10 TABLET, FILM COATED ORAL DAILY
Qty: 30 TABLET | Refills: 1 | Status: SHIPPED | OUTPATIENT
Start: 2023-07-31

## 2023-09-15 ENCOUNTER — OFFICE VISIT (OUTPATIENT)
Dept: OBGYN CLINIC | Facility: CLINIC | Age: 38
End: 2023-09-15
Payer: COMMERCIAL

## 2023-09-15 DIAGNOSIS — R10.2 PELVIC PAIN: Primary | ICD-10-CM

## 2023-09-15 DIAGNOSIS — Z12.4 SCREENING FOR CERVICAL CANCER: ICD-10-CM

## 2023-09-15 DIAGNOSIS — N92.1 MENORRHAGIA WITH IRREGULAR CYCLE: ICD-10-CM

## 2023-09-15 PROCEDURE — 99202 OFFICE O/P NEW SF 15 MIN: CPT | Performed by: ADVANCED PRACTICE MIDWIFE

## 2023-09-15 NOTE — PROGRESS NOTES
[facility] [address] [place] [address]    Assessment/Plan:  No problem-specific Assessment & Plan notes found for this encounter. Diagnoses and all orders for this visit:    Pelvic pain  -     US pelvis complete w transvaginal; Future    Screening for cervical cancer      - will reschedule due to menses. -     Ambulatory Referral to Obstetrics / Gynecology    Menorrhagia with irregular cycle  -     US pelvis complete w transvaginal; Future    - due to heavy bleeding will reschedule pap smear/annual exam  - to get pelvic ultrasound and will follow-up at time of annual.       Subjective:   Particdong Jama is a 41 y.o.  female. CC: heavy bleeding    HPI: Ryan Whitehead is here to meet practice and establish care. LMP 2023, notes that period is extremely heavy and lasts 7-9 days. 2-3 days are heavy stops on the 3rd day and then restarts and is heavy. She notes that she has cramps with menses, Midol is occasionally effective. Did not have a menses for almost 3 yrs and then restarted when she moved in with her mom and daughter. Cycle is irregular. PMS symptoms with menses- extra agitated. Sexually active with partner for 5 yrs. Does not desire STI testing. Pain with intercourse noted- can be with deep penetration or just initiation. Hx of abnormal pap smear in the past- 13 yrs ago abnormal pap smear- abnormal colpo- but did not follow-up. ROS: Review of Systems   Constitutional: Negative for chills, fatigue and fever. Respiratory: Negative for cough and shortness of breath. Cardiovascular: Negative for chest pain and palpitations. Gastrointestinal: Negative for constipation and diarrhea. Genitourinary: Positive for dyspareunia, menstrual problem and pelvic pain. Negative for difficulty urinating, dysuria, frequency, urgency, vaginal bleeding, vaginal discharge and vaginal pain. Neurological: Positive for headaches.    Psychiatric/Behavioral: The patient is nervous/anxious. PFSH: The following portions of the patient's history were reviewed and updated as appropriate: allergies, current medications, past family history, past medical history, obstetric history, gynecologic history, past social history, past surgical history and problem list.       Objective:  LMP 09/14/2023    Physical Exam  Vitals reviewed. Cardiovascular:      Rate and Rhythm: Normal rate. Pulses: Normal pulses. Heart sounds: Normal heart sounds. Pulmonary:      Effort: Pulmonary effort is normal.   Neurological:      Mental Status: She is alert and oriented to person, place, and time.    Psychiatric:         Mood and Affect: Mood normal.         Behavior: Behavior normal.

## 2023-10-05 DIAGNOSIS — F33.1 MODERATE EPISODE OF RECURRENT MAJOR DEPRESSIVE DISORDER (HCC): ICD-10-CM

## 2023-10-05 RX ORDER — VORTIOXETINE 10 MG/1
10 TABLET, FILM COATED ORAL DAILY
Qty: 30 TABLET | Refills: 1 | Status: SHIPPED | OUTPATIENT
Start: 2023-10-05

## 2023-10-23 ENCOUNTER — TELEPHONE (OUTPATIENT)
Dept: PSYCHIATRY | Facility: CLINIC | Age: 38
End: 2023-10-23

## 2023-10-23 DIAGNOSIS — F33.1 MODERATE EPISODE OF RECURRENT MAJOR DEPRESSIVE DISORDER (HCC): ICD-10-CM

## 2023-10-23 RX ORDER — PRAZOSIN HYDROCHLORIDE 2 MG/1
2 CAPSULE ORAL
Qty: 30 CAPSULE | Refills: 1 | Status: SHIPPED | OUTPATIENT
Start: 2023-10-23

## 2023-11-30 ENCOUNTER — HOSPITAL ENCOUNTER (OUTPATIENT)
Dept: ULTRASOUND IMAGING | Facility: HOSPITAL | Age: 38
End: 2023-11-30
Payer: COMMERCIAL

## 2023-11-30 DIAGNOSIS — N92.1 MENORRHAGIA WITH IRREGULAR CYCLE: ICD-10-CM

## 2023-11-30 DIAGNOSIS — R10.2 PELVIC PAIN: ICD-10-CM

## 2023-11-30 PROCEDURE — 76856 US EXAM PELVIC COMPLETE: CPT

## 2023-11-30 PROCEDURE — 76830 TRANSVAGINAL US NON-OB: CPT

## 2023-12-05 ENCOUNTER — TELEPHONE (OUTPATIENT)
Dept: OBGYN CLINIC | Facility: MEDICAL CENTER | Age: 38
End: 2023-12-05

## 2023-12-06 NOTE — TELEPHONE ENCOUNTER
Patient aware of results. No urinary concerns at this time. Patient advised to call the office with any questions or concerns. Patient has yearly exam coming up on 1/15.

## 2023-12-07 DIAGNOSIS — R39.9 UTI SYMPTOMS: Primary | ICD-10-CM

## 2024-01-22 ENCOUNTER — LAB REQUISITION (OUTPATIENT)
Dept: LAB | Facility: HOSPITAL | Age: 39
End: 2024-01-22

## 2024-01-22 DIAGNOSIS — Z02.83 ENCOUNTER FOR BLOOD-ALCOHOL AND BLOOD-DRUG TEST: ICD-10-CM

## 2024-02-16 NOTE — PROGRESS NOTES
[facility] [address] Alvaro Hummel PA    ASSESSMENT/PLAN: Emili Morelos is a 38 y.o.  who presents for annual gynecologic exam.    Encounter for routine gynecologic examination  - Routine well woman exam completed today.  - Cervical Cancer Screening: Current ASCCP Guidelines reviewed. Last Pap: Not on file . Next Pap Due: today  - HPV Vaccination status: Not immunized  - STI screening offered including HIV testing: not indicated based on hx or requested at time of visit  - Contraceptive counseling discussed.  Current contraception: none:   - RTO 1 yr for annual    Additional problems addressed during this visit:  1. Encounter for annual routine gynecological examination  -     Liquid-based pap, screening    2. Screening for cervical cancer  -     Liquid-based pap, screening    3. Menorrhagia with irregular cycle    - desires Nexplanon. Request sent to billing to check coverage and schedule insertion.     CC:  Annual Gynecologic Examination    HPI: Emili Morelos is a 38 y.o.  who presents for annual gynecologic examination.  Emili presents for gyn exam today. 2024 LMP. Menses is regular and heavy, notes clots with menses. Notes some lumps in vaginal area before cycle and they resolve after cycle.  Using no birth control method- states that partner has minimal sperm. Last pap smear - unknown. History of abnormal pap smear- after birth of son- incomplete follow-up. Sexually active- yes, with partner for 6 yrs. HPV vaccine - no. Does not desire STI testing. 6 hrs interrupted sleep per day. 1 servings of calcium rich food per day. No routine exercise per week. 1-3 servings of caffeine per day. Does not perform SBE monthly. Safe at home- yes. Wears seat belts. Concerns : heavy menses. In past used birth control in high school and OCP after birth of son. She is interested in Nexplanon. Vapes nicotine product.   Notes a lot of stress- changing jobs.  Reviewed all  forms of progestin only birth control. Risk/benefit, side effects, administration.       The following portions of the patient's history were reviewed and updated as appropriate: allergies, current medications, past family history, past medical history, obstetric history, gynecologic history, past social history, past surgical history and problem list.    Review of Systems   Constitutional:  Negative for chills, fatigue and fever.   Respiratory:  Negative for cough and shortness of breath.    Cardiovascular:  Negative for chest pain, palpitations and leg swelling.   Gastrointestinal:  Negative for constipation and diarrhea.   Genitourinary:  Positive for menstrual problem. Negative for difficulty urinating, dyspareunia, dysuria, frequency, pelvic pain, urgency, vaginal bleeding, vaginal discharge and vaginal pain.   Neurological:  Positive for headaches. Negative for light-headedness.        Menstrual migraines   Psychiatric/Behavioral:  The patient is nervous/anxious.          Objective:  /76   Ht 5' (1.524 m)   Wt 89.2 kg (196 lb 9.6 oz)   LMP 02/12/2024 (Exact Date)   BMI 38.40 kg/m²    Physical Exam  Vitals reviewed.   Constitutional:       Appearance: Normal appearance.   HENT:      Head: Normocephalic.   Neck:      Thyroid: No thyroid mass or thyroid tenderness.   Cardiovascular:      Rate and Rhythm: Normal rate and regular rhythm.      Heart sounds: Normal heart sounds.   Pulmonary:      Effort: Pulmonary effort is normal.      Breath sounds: Normal breath sounds.   Chest:   Breasts:     Right: No mass, nipple discharge, skin change or tenderness.      Left: No mass, nipple discharge, skin change or tenderness.   Abdominal:      General: There is no distension.      Palpations: There is no mass.      Tenderness: There is no abdominal tenderness. There is no guarding.   Genitourinary:     General: Normal vulva.      Exam position: Lithotomy position.      Labia:         Right: No tenderness or  lesion.         Left: No tenderness or lesion.       Vagina: No vaginal discharge, tenderness, bleeding or lesions.      Cervix: No discharge, lesion, erythema or cervical bleeding.      Uterus: Normal. Not enlarged and not tender.       Adnexa:         Right: Tenderness present. No mass or fullness.          Left: No mass, tenderness or fullness.     Musculoskeletal:      Cervical back: Normal range of motion.   Lymphadenopathy:      Upper Body:      Right upper body: No axillary adenopathy.      Left upper body: No axillary adenopathy.   Skin:     General: Skin is warm and dry.   Neurological:      Mental Status: She is alert.   Psychiatric:         Mood and Affect: Mood normal.         Behavior: Behavior normal.         Judgment: Judgment normal.

## 2024-02-19 ENCOUNTER — ANNUAL EXAM (OUTPATIENT)
Dept: OBGYN CLINIC | Facility: CLINIC | Age: 39
End: 2024-02-19
Payer: COMMERCIAL

## 2024-02-19 VITALS
SYSTOLIC BLOOD PRESSURE: 124 MMHG | DIASTOLIC BLOOD PRESSURE: 76 MMHG | BODY MASS INDEX: 38.6 KG/M2 | HEIGHT: 60 IN | WEIGHT: 196.6 LBS

## 2024-02-19 DIAGNOSIS — Z01.419 ENCOUNTER FOR ANNUAL ROUTINE GYNECOLOGICAL EXAMINATION: Primary | ICD-10-CM

## 2024-02-19 DIAGNOSIS — Z12.4 SCREENING FOR CERVICAL CANCER: ICD-10-CM

## 2024-02-19 DIAGNOSIS — N92.1 MENORRHAGIA WITH IRREGULAR CYCLE: ICD-10-CM

## 2024-02-19 PROCEDURE — G0476 HPV COMBO ASSAY CA SCREEN: HCPCS | Performed by: ADVANCED PRACTICE MIDWIFE

## 2024-02-19 PROCEDURE — G0145 SCR C/V CYTO,THINLAYER,RESCR: HCPCS | Performed by: ADVANCED PRACTICE MIDWIFE

## 2024-02-19 PROCEDURE — 99395 PREV VISIT EST AGE 18-39: CPT | Performed by: ADVANCED PRACTICE MIDWIFE

## 2024-02-20 LAB
HPV HR 12 DNA CVX QL NAA+PROBE: NEGATIVE
HPV16 DNA CVX QL NAA+PROBE: NEGATIVE
HPV18 DNA CVX QL NAA+PROBE: NEGATIVE

## 2024-02-22 ENCOUNTER — TELEPHONE (OUTPATIENT)
Dept: OBGYN CLINIC | Facility: MEDICAL CENTER | Age: 39
End: 2024-02-22

## 2024-02-22 NOTE — TELEPHONE ENCOUNTER
----- Message from Rosalia Martinez CNM sent at 2/19/2024  9:46 AM EST -----  Regarding: prior auth Nexplanon  Patient is interested in having a Nexplanon. Could you check coverage. Thank you

## 2024-02-22 NOTE — TELEPHONE ENCOUNTER
Called patient's insurance to verify if PA is needed for Nexplanon IUD.      Per patient's insurance, no PA needed as long as IUD is placed in an outpatient setting.     Codes: , 41760, [id number].    Representatives: Angela NUNEZ And Barbara BOSS    Reference # (Representatives names and todays date).

## 2024-02-23 LAB
LAB AP GYN PRIMARY INTERPRETATION: NORMAL
Lab: NORMAL

## 2024-04-03 ENCOUNTER — VBI (OUTPATIENT)
Dept: ADMINISTRATIVE | Facility: OTHER | Age: 39
End: 2024-04-03

## 2024-11-03 ENCOUNTER — OFFICE VISIT (OUTPATIENT)
Dept: URGENT CARE | Facility: CLINIC | Age: 39
End: 2024-11-03

## 2024-11-03 VITALS
DIASTOLIC BLOOD PRESSURE: 83 MMHG | OXYGEN SATURATION: 100 % | BODY MASS INDEX: 41.4 KG/M2 | RESPIRATION RATE: 16 BRPM | HEART RATE: 88 BPM | WEIGHT: 212 LBS | SYSTOLIC BLOOD PRESSURE: 164 MMHG | TEMPERATURE: 98.5 F

## 2024-11-03 DIAGNOSIS — R22.0 RIGHT FACIAL SWELLING: ICD-10-CM

## 2024-11-03 DIAGNOSIS — K04.7 DENTAL ABSCESS: Primary | ICD-10-CM

## 2024-11-03 PROCEDURE — 99213 OFFICE O/P EST LOW 20 MIN: CPT | Performed by: NURSE PRACTITIONER

## 2024-11-03 RX ORDER — SULFAMETHOXAZOLE AND TRIMETHOPRIM 800; 160 MG/1; MG/1
1 TABLET ORAL EVERY 12 HOURS SCHEDULED
Qty: 20 TABLET | Refills: 0 | Status: SHIPPED | OUTPATIENT
Start: 2024-11-03 | End: 2024-11-13

## 2024-11-03 NOTE — PROGRESS NOTES
[facility]        NAME: Emili Morelos is a 39 y.o. female  : 1985    MRN: [id number]  DATE: November 3, 2024  TIME: 3:36 PM    Assessment and Plan   Dental abscess [K04.7]  1. Dental abscess  sulfamethoxazole-trimethoprim (BACTRIM DS) 800-160 mg per tablet    Ambulatory Referral to Dentistry      2. Right facial swelling  sulfamethoxazole-trimethoprim (BACTRIM DS) 800-160 mg per tablet    Ambulatory Referral to Dentistry            Patient Instructions       Follow up with PCP in 3-5 days.  Proceed to  ER if symptoms worsen.    If tests have been performed at [facility], our office will contact you with results if changes need to be made to the care plan discussed with you at the visit.  You can [facility] full [facility] on [facility].      You are to take the Bactrim DS as prescribed.    You stated this is one of the antibiotics you can only take.  Take tylenol and motrin for pain  You MUST see a dentist for dental work. You are to to directly to the ED if symptoms worsen    Follow up with your PCP in 3-5 days  Go to the ED if symptoms worsen     You have been prescribed an antibiotic - you are to take an oral probiotic and eat yogurt to avoid GI issues/diarrhea.      Chief Complaint     Chief Complaint   Patient presents with    Dental Pain     Right side bottom tooth pain started yesterday morning.Swollen cheek         History of Present Illness       This is a 39 year old female who is here for dental pain and swelling since yesterday morning. She states she has right jaw swelling and difficulty eating. Denies trouble swallowing or drooling.  She denies having a dentist for follow up.  She states she is afraid of the dentist.  She states that keflex and bactrim are the only 2 abx that she can take. She denies pregnancy.   PMH is listed and reviewed.         Review of Systems   Review of Systems   Constitutional: Negative.    HENT:  Positive for dental problem and facial swelling.     Eyes: Negative.    Respiratory: Negative.     Cardiovascular: Negative.    Gastrointestinal: Negative.    Endocrine: Negative.    Genitourinary: Negative.    Musculoskeletal: Negative.    Skin: Negative.    Allergic/Immunologic: Negative.    Neurological: Negative.    Hematological: Negative.    Psychiatric/Behavioral: Negative.           Current Medications       Current Outpatient Medications:     albuterol (PROVENTIL HFA,VENTOLIN HFA) 90 mcg/act inhaler, Inhale 2 puffs every 6 (six) hours as needed for wheezing or shortness of breath, Disp: 8 g, Rfl: 5    prazosin (MINIPRESS) 2 mg capsule, Take 1 capsule (2 mg total) by mouth daily at bedtime, Disp: 30 capsule, Rfl: 1    sulfamethoxazole-trimethoprim (BACTRIM DS) 800-160 mg per tablet, Take 1 tablet by mouth every 12 (twelve) hours for 10 days, Disp: 20 tablet, Rfl: 0    vortioxetine (Trintellix) 10 MG tablet, Take 1 tablet (10 mg total) by mouth daily (Patient taking differently: Take 20 mg by mouth daily), Disp: 30 tablet, Rfl: 1    Current Allergies     Allergies as of 2024 - Reviewed 2024   Allergen Reaction Noted    Lamictal [lamotrigine] Fever 2023    Citalopram Hives 2021    Amoxicillin Hives 2016    Penicillins Hives 2016    Vistaril [hydroxyzine] Hives 2021            The following portions of the patient's history were reviewed and updated as appropriate: allergies, current medications, past family history, past medical history, past social history, past surgical history and problem list.     Past Medical History:   Diagnosis Date    Abnormal Pap smear of cervix     Asthma     Depression     History of transfusion     Hypertension     Migraine     Opioid abuse, in remission ([facility])     Substance abuse ([facility])        Past Surgical History:   Procedure Laterality Date     SECTION      TONSILLECTOMY      WISDOM TOOTH EXTRACTION         Family History   Problem Relation Age of Onset    Asthma Mother     Stroke  Mother     Thyroid disease Mother     Lung disease Father          Medications have been verified.        Objective   /83   Pulse 88   Temp 98.5 °F (36.9 °C)   Resp 16   Wt 96.2 kg (212 lb)   SpO2 100%   BMI 41.40 kg/m²   No LMP recorded.       Physical Exam     Physical Exam  Vitals and nursing note reviewed.   Constitutional:       General: She is not in acute distress.     Appearance: Normal appearance. She is obese. She is not ill-appearing, toxic-appearing or diaphoretic.   HENT:      Head:        Nose: Nose normal.      Mouth/Throat:      Mouth: Mucous membranes are moist.      Pharynx: No oropharyngeal exudate or posterior oropharyngeal erythema.      Comments: Airway patent   Eyes:      Extraocular Movements: Extraocular movements intact.   Cardiovascular:      Rate and Rhythm: Normal rate and regular rhythm.      Pulses: Normal pulses.      Heart sounds: Normal heart sounds.   Pulmonary:      Effort: Pulmonary effort is normal.      Breath sounds: Normal breath sounds.   Musculoskeletal:         General: Normal range of motion.      Cervical back: Normal range of motion.   Skin:     General: Skin is warm and dry.      Capillary Refill: Capillary refill takes less than 2 seconds.   Neurological:      General: No focal deficit present.      Mental Status: She is alert and oriented to person, place, and time.   Psychiatric:         Mood and Affect: Mood normal.         Behavior: Behavior normal.         Thought Content: Thought content normal.         Judgment: Judgment normal.

## 2024-11-03 NOTE — PATIENT INSTRUCTIONS
You are to take the Bactrim DS as prescribed.    You stated this is one of the antibiotics you can only take.  Take tylenol and motrin for pain  You MUST see a dentist for dental work. You are to to directly to the ED if symptoms worsen    Follow up with your PCP in 3-5 days  Go to the ED if symptoms worsen     You have been prescribed an antibiotic - you are to take an oral probiotic and eat yogurt to avoid GI issues/diarrhea.

## 2024-11-04 ENCOUNTER — DOCUMENTATION (OUTPATIENT)
Dept: ADMINISTRATIVE | Facility: OTHER | Age: 39
End: 2024-11-04

## 2024-11-04 NOTE — PROGRESS NOTES
Blood pressure elevated  Appointment department: [facility]  Appointment provider: LUCIAN Hendrickson  Blood pressure   11/03/24 1533 164/83   11/03/24 1524 141/90

## 2024-11-04 NOTE — PROGRESS NOTES
11/04/24 2:36 PM    Patient was called after the Urgent Care visit Patient declined to schedule appointment.  Patient stated that she  just bought a BP machine and will start taking her BP - if elevated she will call to schedule an appt - she is not experiencing any symptoms/    Thank you.  Zoey Javier MA  [facility]